# Patient Record
Sex: MALE | Race: OTHER | NOT HISPANIC OR LATINO | URBAN - METROPOLITAN AREA
[De-identification: names, ages, dates, MRNs, and addresses within clinical notes are randomized per-mention and may not be internally consistent; named-entity substitution may affect disease eponyms.]

---

## 2018-02-22 ENCOUNTER — IMPORTED ENCOUNTER (OUTPATIENT)
Dept: URBAN - METROPOLITAN AREA CLINIC 50 | Facility: CLINIC | Age: 68
End: 2018-02-22

## 2018-03-09 ENCOUNTER — IMPORTED ENCOUNTER (OUTPATIENT)
Dept: URBAN - METROPOLITAN AREA CLINIC 50 | Facility: CLINIC | Age: 68
End: 2018-03-09

## 2018-04-05 ENCOUNTER — IMPORTED ENCOUNTER (OUTPATIENT)
Dept: URBAN - METROPOLITAN AREA CLINIC 50 | Facility: CLINIC | Age: 68
End: 2018-04-05

## 2019-01-24 ENCOUNTER — IMAGING SERVICES (OUTPATIENT)
Dept: OTHER | Age: 69
End: 2019-01-24

## 2019-03-08 ENCOUNTER — IMPORTED ENCOUNTER (OUTPATIENT)
Dept: URBAN - METROPOLITAN AREA CLINIC 50 | Facility: CLINIC | Age: 69
End: 2019-03-08

## 2020-03-13 ENCOUNTER — IMPORTED ENCOUNTER (OUTPATIENT)
Dept: URBAN - METROPOLITAN AREA CLINIC 50 | Facility: CLINIC | Age: 70
End: 2020-03-13

## 2020-03-16 ENCOUNTER — IMPORTED ENCOUNTER (OUTPATIENT)
Dept: URBAN - METROPOLITAN AREA CLINIC 50 | Facility: CLINIC | Age: 70
End: 2020-03-16

## 2020-06-04 ENCOUNTER — IMPORTED ENCOUNTER (OUTPATIENT)
Dept: URBAN - METROPOLITAN AREA CLINIC 50 | Facility: CLINIC | Age: 70
End: 2020-06-04

## 2020-06-10 ENCOUNTER — IMPORTED ENCOUNTER (OUTPATIENT)
Dept: URBAN - METROPOLITAN AREA CLINIC 50 | Facility: CLINIC | Age: 70
End: 2020-06-10

## 2020-06-10 NOTE — PATIENT DISCUSSION
"""S/P IOL OD: Tecnis ZCB00 21.5 +ORA/Femto/Arcs +Omidria. Continue post operative instructions and drops per schedule.  """

## 2020-06-17 ENCOUNTER — IMPORTED ENCOUNTER (OUTPATIENT)
Dept: URBAN - METROPOLITAN AREA CLINIC 50 | Facility: CLINIC | Age: 70
End: 2020-06-17

## 2020-06-24 ENCOUNTER — IMPORTED ENCOUNTER (OUTPATIENT)
Dept: URBAN - METROPOLITAN AREA CLINIC 50 | Facility: CLINIC | Age: 70
End: 2020-06-24

## 2020-06-24 NOTE — PATIENT DISCUSSION
"""S/P IOL OS: Tecnis ZCB00 21.5 +Femto/Arcs +Omidria. Continue post operative instructions and drops per schedule.  """

## 2020-07-02 ENCOUNTER — IMPORTED ENCOUNTER (OUTPATIENT)
Dept: URBAN - METROPOLITAN AREA CLINIC 50 | Facility: CLINIC | Age: 70
End: 2020-07-02

## 2020-07-22 ENCOUNTER — IMPORTED ENCOUNTER (OUTPATIENT)
Dept: URBAN - METROPOLITAN AREA CLINIC 50 | Facility: CLINIC | Age: 70
End: 2020-07-22

## 2020-07-22 NOTE — PATIENT DISCUSSION
"""Informed patient that their capsular opacification is not visually significant or does not meet the minimum criteria for capsulotomy.  Recommended attention to PCO symptoms Patient notified of pathology results and follow-up plan.    Copy of pathology results and plan sent to PCP.  Will follow up as planned.  Sally Rich RN 12/17/19 2:41 PM

## 2021-01-13 ENCOUNTER — IMPORTED ENCOUNTER (OUTPATIENT)
Dept: URBAN - METROPOLITAN AREA CLINIC 50 | Facility: CLINIC | Age: 71
End: 2021-01-13

## 2021-01-21 ENCOUNTER — IMPORTED ENCOUNTER (OUTPATIENT)
Dept: URBAN - METROPOLITAN AREA CLINIC 50 | Facility: CLINIC | Age: 71
End: 2021-01-21

## 2021-04-18 ASSESSMENT — VISUAL ACUITY
OD_CC: 20/25-
OS_CC: 20/30
OS_OTHER: 20/30. 20/400.
OD_BAT: 20/30
OS_CC: 20/25-
OD_BAT: 20/60
OD_OTHER: 20/20. 20/20.
OS_OTHER: 20/40. 20/60.
OS_CC: 20/200
OD_CC: J1
OS_OTHER: 20/60. 20/200.
OD_SC: 20/25
OD_CC: J1+@ 16 IN
OS_BAT: 20/60
OS_SC: 20/20
OD_PH: 20/25
OS_SC: 20/20 BLURRY
OS_BAT: 20/60
OS_CC: J1+
OD_CC: 20/20
OS_BAT: 20/20
OS_OTHER: 20/20. 20/20.
OD_OTHER: 20/40-2. 20/70.
OS_CC: J1
OS_OTHER: 20/70. 20/400.
OD_OTHER: 20/25. 20/400.
OS_OTHER: 20/60. 20/60.
OD_OTHER: 20/40-2. 20/70.
OS_CC: 20/25
OS_CC: J1+@ 16 IN
OD_CC: 20/20
OS_CC: 20/200
OD_CC: J1+
OS_PH: 20/30-2
OS_PH: 20/25
OD_OTHER: 20/40-2. 20/70.
OD_OTHER: 20/30. 20/40.
OS_OTHER: 20/60. 20/60.
OD_BAT: 20/20
OD_OTHER: 20/60. 20/200.
OD_CC: 20/30
OD_BAT: 20/40-2
OS_PH: @ 16 IN
OS_CC: J1+
OD_SC: 20/20
OD_SC: 20/20-1
OD_CC: J1+
OS_SC: 20/30
OD_BAT: 20/40-2
OS_CC: 20/40
OD_BAT: 20/40-2
OS_BAT: 20/60
OD_CC: 20/25
OS_BAT: 20/30
OS_BAT: 20/70
OS_CC: 20/20
OD_SC: 20/40
OD_CC: J1@ 14 IN
OD_CC: 20/30-1
OD_BAT: 20/25
OS_BAT: 20/40
OS_CC: J1@ 14 IN
OS_CC: 20/70

## 2021-04-18 ASSESSMENT — TONOMETRY
OS_IOP_MMHG: 10
OS_IOP_MMHG: 14
OD_IOP_MMHG: 14
OD_IOP_MMHG: 14
OD_IOP_MMHG: 11
OS_IOP_MMHG: 11
OS_IOP_MMHG: 15
OD_IOP_MMHG: 16
OD_IOP_MMHG: 11
OS_IOP_MMHG: 17
OS_IOP_MMHG: 11
OD_IOP_MMHG: 14
OD_IOP_MMHG: 14
OD_IOP_MMHG: 11
OD_IOP_MMHG: 31
OS_IOP_MMHG: 12
OS_IOP_MMHG: 14
OS_IOP_MMHG: 16

## 2022-01-05 ENCOUNTER — PREPPED CHART (OUTPATIENT)
Dept: URBAN - METROPOLITAN AREA CLINIC 53 | Facility: CLINIC | Age: 72
End: 2022-01-05

## 2022-02-03 ENCOUNTER — COMPREHENSIVE EXAM (OUTPATIENT)
Dept: URBAN - METROPOLITAN AREA CLINIC 53 | Facility: CLINIC | Age: 72
End: 2022-02-03

## 2022-02-03 DIAGNOSIS — H43.813: ICD-10-CM

## 2022-02-03 DIAGNOSIS — H26.493: ICD-10-CM

## 2022-02-03 PROCEDURE — 92014 COMPRE OPH EXAM EST PT 1/>: CPT

## 2022-02-03 ASSESSMENT — KERATOMETRY
OS_K2POWER_DIOPTERS: 44.25
OD_K1POWER_DIOPTERS: 44.00
OS_AXISANGLE2_DEGREES: 3
OS_AXISANGLE_DEGREES: 93
OD_AXISANGLE_DEGREES: 69
OD_K2POWER_DIOPTERS: 44.25
OS_K1POWER_DIOPTERS: 44.00
OD_AXISANGLE2_DEGREES: 159

## 2022-02-03 ASSESSMENT — VISUAL ACUITY
OD_CC: 20/20-1
OS_GLARE: 20/20
OU_CC: J1+
OD_GLARE: 20/25
OD_GLARE: 20/20
OS_CC: 20/20-1
OS_GLARE: 20/25-1

## 2022-02-03 ASSESSMENT — TONOMETRY
OS_IOP_MMHG: 11
OD_IOP_MMHG: 11

## 2022-12-06 ENCOUNTER — CONTACT LENSES/GLASSES VISIT (OUTPATIENT)
Dept: URBAN - METROPOLITAN AREA CLINIC 53 | Facility: CLINIC | Age: 72
End: 2022-12-06

## 2022-12-06 PROCEDURE — 92015GRNC REFRACTION GLASSES RECHECK NO CHARGE

## 2022-12-06 ASSESSMENT — KERATOMETRY
OS_K1POWER_DIOPTERS: 44.00
OD_K1POWER_DIOPTERS: 44.00
OS_K2POWER_DIOPTERS: 44.25
OD_AXISANGLE_DEGREES: 69
OD_K2POWER_DIOPTERS: 44.25
OS_AXISANGLE2_DEGREES: 3
OS_AXISANGLE_DEGREES: 93
OD_AXISANGLE2_DEGREES: 159

## 2022-12-06 ASSESSMENT — VISUAL ACUITY
OU_CC: 20/20
OS_CC: 20/20
OD_CC: 20/20

## 2022-12-06 NOTE — PATIENT DISCUSSION
Increasing prism from 4BO to [address]. 8BO was found today, however cutting back prism in distance specs to help patient adapt to the change. Having patient trial Fresnel prisms over fashion lenses first before finalizing prescription. Explained to patient the undercorrection. He expressed understanding.  Patient to return in 2 weeks for prism re-eval.

## 2022-12-28 ENCOUNTER — CONTACT LENSES/GLASSES VISIT (OUTPATIENT)
Dept: URBAN - METROPOLITAN AREA CLINIC 53 | Facility: CLINIC | Age: 72
End: 2022-12-28

## 2022-12-28 PROCEDURE — 92015GRNC REFRACTION GLASSES RECHECK NO CHARGE

## 2022-12-28 ASSESSMENT — KERATOMETRY
OS_K1POWER_DIOPTERS: 44.00
OS_AXISANGLE2_DEGREES: 3
OS_K2POWER_DIOPTERS: 44.25
OD_K1POWER_DIOPTERS: 44.00
OD_K2POWER_DIOPTERS: 44.25
OD_AXISANGLE2_DEGREES: 159
OD_AXISANGLE_DEGREES: 69
OS_AXISANGLE_DEGREES: 93

## 2022-12-28 NOTE — PATIENT DISCUSSION
Per pt record Dr. Tristen Rachel believes that this was a traumatic 4th from [address] in 1988 that is partially symptomatic.

## 2024-03-06 ENCOUNTER — ESTABLISHED PATIENT (OUTPATIENT)
Dept: URBAN - METROPOLITAN AREA CLINIC 52 | Facility: CLINIC | Age: 74
End: 2024-03-06

## 2024-03-06 DIAGNOSIS — H53.2: ICD-10-CM

## 2024-03-06 DIAGNOSIS — H26.493: ICD-10-CM

## 2024-03-06 PROCEDURE — 92015 DETERMINE REFRACTIVE STATE: CPT

## 2024-03-06 PROCEDURE — 99214 OFFICE O/P EST MOD 30 MIN: CPT

## 2024-03-06 ASSESSMENT — TONOMETRY
OS_IOP_MMHG: 12
OD_IOP_MMHG: 11

## 2024-03-06 ASSESSMENT — KERATOMETRY
OS_K1POWER_DIOPTERS: 43.75
OS_AXISANGLE_DEGREES: 101
OD_AXISANGLE2_DEGREES: 159
OD_AXISANGLE_DEGREES: 69
OD_K1POWER_DIOPTERS: 44.00
OS_AXISANGLE2_DEGREES: 11
OD_K2POWER_DIOPTERS: 44.00
OD_AXISANGLE2_DEGREES: 90
OD_AXISANGLE_DEGREES: 180
OS_AXISANGLE2_DEGREES: 3
OS_K1POWER_DIOPTERS: 44.00
OD_K2POWER_DIOPTERS: 44.25
OS_AXISANGLE_DEGREES: 93
OS_K2POWER_DIOPTERS: 44.25

## 2024-03-06 ASSESSMENT — VISUAL ACUITY
OS_CC: 20/20
OU_CC: 20/25-2
OD_CC: 20/25

## 2025-04-29 ENCOUNTER — COMPREHENSIVE EXAM (OUTPATIENT)
Age: 75
End: 2025-04-29

## 2025-04-29 DIAGNOSIS — H43.813: ICD-10-CM

## 2025-04-29 DIAGNOSIS — H18.591: ICD-10-CM

## 2025-04-29 DIAGNOSIS — H04.123: ICD-10-CM

## 2025-04-29 PROCEDURE — 99214 OFFICE O/P EST MOD 30 MIN: CPT

## 2025-04-29 PROCEDURE — 92134 CPTRZ OPH DX IMG PST SGM RTA: CPT | Mod: NC

## 2025-05-14 ENCOUNTER — CONSULTATION/EVALUATION (OUTPATIENT)
Age: 75
End: 2025-05-14

## 2025-05-14 DIAGNOSIS — H52.4: ICD-10-CM

## 2025-05-14 DIAGNOSIS — H43.813: ICD-10-CM

## 2025-05-14 DIAGNOSIS — H04.123: ICD-10-CM

## 2025-05-14 DIAGNOSIS — H26.493: ICD-10-CM

## 2025-05-14 DIAGNOSIS — H18.591: ICD-10-CM

## 2025-05-14 PROCEDURE — 66821 AFTER CATARACT LASER SURGERY: CPT

## 2025-05-14 PROCEDURE — 99214 OFFICE O/P EST MOD 30 MIN: CPT | Mod: 57

## 2025-06-18 ENCOUNTER — APPOINTMENT (OUTPATIENT)
Dept: GENERAL RADIOLOGY | Age: 75
End: 2025-06-18
Attending: PHYSICIAN ASSISTANT
Payer: MEDICARE

## 2025-06-18 ENCOUNTER — APPOINTMENT (OUTPATIENT)
Dept: GENERAL RADIOLOGY | Age: 75
End: 2025-06-18
Attending: EMERGENCY MEDICINE
Payer: MEDICARE

## 2025-06-18 ENCOUNTER — OFFICE VISIT (OUTPATIENT)
Dept: FAMILY MEDICINE CLINIC | Facility: CLINIC | Age: 75
End: 2025-06-18
Payer: MEDICARE

## 2025-06-18 ENCOUNTER — HOSPITAL ENCOUNTER (INPATIENT)
Facility: HOSPITAL | Age: 75
LOS: 2 days | Discharge: HOME | End: 2025-06-20
Attending: EMERGENCY MEDICINE | Admitting: INTERNAL MEDICINE
Payer: MEDICARE

## 2025-06-18 ENCOUNTER — APPOINTMENT (OUTPATIENT)
Dept: CT IMAGING | Age: 75
End: 2025-06-18
Attending: PHYSICIAN ASSISTANT
Payer: MEDICARE

## 2025-06-18 VITALS
OXYGEN SATURATION: 98 % | HEART RATE: 124 BPM | WEIGHT: 225.19 LBS | RESPIRATION RATE: 20 BRPM | SYSTOLIC BLOOD PRESSURE: 128 MMHG | TEMPERATURE: 99 F | DIASTOLIC BLOOD PRESSURE: 80 MMHG

## 2025-06-18 DIAGNOSIS — R06.02 SHORTNESS OF BREATH: Primary | ICD-10-CM

## 2025-06-18 DIAGNOSIS — R10.10 UPPER ABDOMINAL PAIN: ICD-10-CM

## 2025-06-18 DIAGNOSIS — R59.0 INTRA-ABDOMINAL LYMPHADENOPATHY: ICD-10-CM

## 2025-06-18 DIAGNOSIS — R11.2 NAUSEA AND VOMITING, UNSPECIFIED VOMITING TYPE: ICD-10-CM

## 2025-06-18 DIAGNOSIS — K56.609 SMALL BOWEL OBSTRUCTION (HCC): Primary | ICD-10-CM

## 2025-06-18 DIAGNOSIS — R00.0 TACHYCARDIA: ICD-10-CM

## 2025-06-18 PROBLEM — K22.70 BARRETT'S ESOPHAGUS: Status: ACTIVE | Noted: 2025-06-18

## 2025-06-18 PROBLEM — K21.9 GASTROESOPHAGEAL REFLUX DISEASE: Status: ACTIVE | Noted: 2025-06-18

## 2025-06-18 PROBLEM — J30.9 ALLERGIC RHINITIS: Status: ACTIVE | Noted: 2023-10-26

## 2025-06-18 LAB
ALBUMIN SERPL-MCNC: 4.5 G/DL (ref 3.2–4.8)
ALBUMIN/GLOB SERPL: 1.3 {RATIO} (ref 1–2)
ALP LIVER SERPL-CCNC: 105 U/L (ref 45–117)
ALT SERPL-CCNC: 23 U/L (ref 10–49)
ANION GAP SERPL CALC-SCNC: 9 MMOL/L (ref 0–18)
AST SERPL-CCNC: 34 U/L (ref ?–34)
BASOPHILS # BLD AUTO: 0.03 X10(3) UL (ref 0–0.2)
BASOPHILS NFR BLD AUTO: 0.3 %
BILIRUB SERPL-MCNC: 0.7 MG/DL (ref 0.2–1.1)
BUN BLD-MCNC: 21 MG/DL (ref 9–23)
CALCIUM BLD-MCNC: 9.2 MG/DL (ref 8.7–10.6)
CHLORIDE SERPL-SCNC: 104 MMOL/L (ref 98–112)
CO2 SERPL-SCNC: 26 MMOL/L (ref 21–32)
CREAT BLD-MCNC: 1.3 MG/DL (ref 0.7–1.3)
EGFRCR SERPLBLD CKD-EPI 2021: 57 ML/MIN/1.73M2 (ref 60–?)
EOSINOPHIL # BLD AUTO: 0.01 X10(3) UL (ref 0–0.7)
EOSINOPHIL NFR BLD AUTO: 0.1 %
ERYTHROCYTE [DISTWIDTH] IN BLOOD BY AUTOMATED COUNT: 14.6 %
GLOBULIN PLAS-MCNC: 3.6 G/DL (ref 2–3.5)
GLUCOSE BLD-MCNC: 125 MG/DL (ref 70–99)
HCT VFR BLD AUTO: 44.4 % (ref 39–53)
HGB BLD-MCNC: 15.2 G/DL (ref 13–17.5)
IMM GRANULOCYTES # BLD AUTO: 0.04 X10(3) UL (ref 0–1)
IMM GRANULOCYTES NFR BLD: 0.4 %
INR BLD: 1.09 (ref 0.8–1.2)
LACTATE SERPL-SCNC: 1.6 MMOL/L (ref 0.5–2)
LDH SERPL L TO P-CCNC: 378 U/L (ref 120–246)
LIPASE SERPL-CCNC: 20 U/L (ref 12–53)
LYMPHOCYTES # BLD AUTO: 0.68 X10(3) UL (ref 1–4)
LYMPHOCYTES NFR BLD AUTO: 7.2 %
MCH RBC QN AUTO: 30.1 PG (ref 26–34)
MCHC RBC AUTO-ENTMCNC: 34.2 G/DL (ref 31–37)
MCV RBC AUTO: 87.9 FL (ref 80–100)
MONOCYTES # BLD AUTO: 0.89 X10(3) UL (ref 0.1–1)
MONOCYTES NFR BLD AUTO: 9.4 %
NEUTROPHILS # BLD AUTO: 7.84 X10 (3) UL (ref 1.5–7.7)
NEUTROPHILS # BLD AUTO: 7.84 X10(3) UL (ref 1.5–7.7)
NEUTROPHILS NFR BLD AUTO: 82.6 %
OSMOLALITY SERPL CALC.SUM OF ELEC: 292 MOSM/KG (ref 275–295)
PLATELET # BLD AUTO: 326 10(3)UL (ref 150–450)
POTASSIUM SERPL-SCNC: 3.5 MMOL/L (ref 3.5–5.1)
PROT SERPL-MCNC: 8.1 G/DL (ref 5.7–8.2)
PROTHROMBIN TIME: 13.9 SECONDS (ref 11.6–14.8)
RBC # BLD AUTO: 5.05 X10(6)UL (ref 3.8–5.8)
SODIUM SERPL-SCNC: 139 MMOL/L (ref 136–145)
TROPONIN I SERPL HS-MCNC: 4 NG/L (ref ?–53)
TROPONIN I SERPL HS-MCNC: 4 NG/L (ref ?–53)
WBC # BLD AUTO: 9.5 X10(3) UL (ref 4–11)

## 2025-06-18 PROCEDURE — 71045 X-RAY EXAM CHEST 1 VIEW: CPT | Performed by: EMERGENCY MEDICINE

## 2025-06-18 PROCEDURE — 99223 1ST HOSP IP/OBS HIGH 75: CPT | Performed by: INTERNAL MEDICINE

## 2025-06-18 PROCEDURE — 99215 OFFICE O/P EST HI 40 MIN: CPT | Performed by: FAMILY MEDICINE

## 2025-06-18 PROCEDURE — 74177 CT ABD & PELVIS W/CONTRAST: CPT | Performed by: PHYSICIAN ASSISTANT

## 2025-06-18 PROCEDURE — 71045 X-RAY EXAM CHEST 1 VIEW: CPT | Performed by: PHYSICIAN ASSISTANT

## 2025-06-18 RX ORDER — ONDANSETRON 2 MG/ML
4 INJECTION INTRAMUSCULAR; INTRAVENOUS EVERY 6 HOURS PRN
Status: DISCONTINUED | OUTPATIENT
Start: 2025-06-18 | End: 2025-06-18

## 2025-06-18 RX ORDER — ONDANSETRON 2 MG/ML
4 INJECTION INTRAMUSCULAR; INTRAVENOUS ONCE
Status: COMPLETED | OUTPATIENT
Start: 2025-06-18 | End: 2025-06-18

## 2025-06-18 RX ORDER — ACETAMINOPHEN 500 MG
500 TABLET ORAL EVERY 4 HOURS PRN
Status: DISCONTINUED | OUTPATIENT
Start: 2025-06-18 | End: 2025-06-20

## 2025-06-18 RX ORDER — METOCLOPRAMIDE HYDROCHLORIDE 5 MG/ML
5 INJECTION INTRAMUSCULAR; INTRAVENOUS EVERY 8 HOURS PRN
Status: DISCONTINUED | OUTPATIENT
Start: 2025-06-18 | End: 2025-06-20

## 2025-06-18 RX ORDER — METOCLOPRAMIDE HYDROCHLORIDE 5 MG/ML
5 INJECTION INTRAMUSCULAR; INTRAVENOUS 3 TIMES DAILY
Status: DISCONTINUED | OUTPATIENT
Start: 2025-06-18 | End: 2025-06-18

## 2025-06-18 RX ORDER — SODIUM CHLORIDE 9 MG/ML
INJECTION, SOLUTION INTRAVENOUS CONTINUOUS
Status: DISCONTINUED | OUTPATIENT
Start: 2025-06-18 | End: 2025-06-20

## 2025-06-18 RX ORDER — ENOXAPARIN SODIUM 100 MG/ML
40 INJECTION SUBCUTANEOUS DAILY
Status: DISCONTINUED | OUTPATIENT
Start: 2025-06-18 | End: 2025-06-20

## 2025-06-18 RX ORDER — MORPHINE SULFATE 2 MG/ML
1 INJECTION, SOLUTION INTRAMUSCULAR; INTRAVENOUS EVERY 2 HOUR PRN
Status: DISCONTINUED | OUTPATIENT
Start: 2025-06-18 | End: 2025-06-20

## 2025-06-18 RX ORDER — MORPHINE SULFATE 2 MG/ML
2 INJECTION, SOLUTION INTRAMUSCULAR; INTRAVENOUS EVERY 2 HOUR PRN
Status: DISCONTINUED | OUTPATIENT
Start: 2025-06-18 | End: 2025-06-20

## 2025-06-18 RX ORDER — MORPHINE SULFATE 4 MG/ML
4 INJECTION, SOLUTION INTRAMUSCULAR; INTRAVENOUS EVERY 2 HOUR PRN
Status: DISCONTINUED | OUTPATIENT
Start: 2025-06-18 | End: 2025-06-20

## 2025-06-18 RX ORDER — PANTOPRAZOLE SODIUM 40 MG/1
40 TABLET, DELAYED RELEASE ORAL DAILY
COMMUNITY

## 2025-06-18 NOTE — ED INITIAL ASSESSMENT (HPI)
Pt to ed with c/o n/v and one episode of diarrhea, SOB on exertion. Since this AM, denies CP    Statement Selected

## 2025-06-18 NOTE — ED QUICK NOTES
Patient's NG tube disconnected from LIS and clamped for ambulance transport to [facility].

## 2025-06-18 NOTE — ED PROVIDER NOTES
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  States he has had approximately 8 episodes of nonbloody emesis and 1 episode of nonbloody diarrhea.  States abdomen feels \"sore\".  He denies any chest pain but feels like his breathing is \"labored\".  No pain with deep breaths.  No fevers or URI symptoms.  States he went to his PCP and was sent to ED due to tachycardia.  Surgical history significant for cholecystectomy.      Objective:     Past Medical History:    Hickey's syndrome              Past Surgical History:   Procedure Laterality Date    Excis lumbar disk,one level      Removal gallbladder                  Social History     Socioeconomic History    Marital status:    Tobacco Use    Smoking status: Never    Smokeless tobacco: Never   Vaping Use    Vaping status: Never Used   Substance and Sexual Activity    Alcohol use: Never    Drug use: Never     Social Drivers of Health     Transportation Needs: No Transportation Needs (4/12/2023)    [facility]     Lack of Transportation (Medical): No     Lack of Transportation (Non-Medical): No                Physical Exam    ED Triage Vitals [06/18/25 1120]   BP (!) 141/93   Pulse 109   Resp 18   Temp 98.7 °F (37.1 °C)   Temp src Oral   SpO2 98 %   O2 Device None (Room air)       Current Vitals:   Vital Signs  BP: 140/79  Pulse: 80  Resp: 18  Temp: 98.7 °F (37.1 °C)  Temp src: Oral    Oxygen Therapy  SpO2: 99 %  O2 Device: None (Room air)            Physical Exam  Pulse ox reviewed. O2 saturation within  normal limits for this pt    Constitutional: no acute distress, nontoxic appearing,   Head: normocephalic atraumatic,   Eyes: PERRL, EOMI,   ENT: dry oral mucosa  Neck: supple, full ROM  Heart: tachycardic  Lungs: no respiratory distress, speaking in full sentences  GI: abdomen nondistended, soft, + right sided abdominal tenderness, otherwise nontender, no peritoneal signs  MSK: normal movement of all extremities  Skin: pink, warm  Neurology: alert, responding appropriately        ED Course  Labs Reviewed   CBC WITH DIFFERENTIAL WITH PLATELET - Abnormal; Notable for the following components:       Result Value    Neutrophil Absolute Prelim 7.84 (*)     Neutrophil Absolute 7.84 (*)     Lymphocyte Absolute 0.68 (*)     All other components within normal limits   COMP METABOLIC PANEL (14) - Abnormal; Notable for the following components:    Glucose 125 (*)     eGFR-Cr 57 (*)     AST 34 (*)     Globulin  3.6 (*)     All other components within normal limits   LIPASE - Normal   TROPONIN I HIGH SENSITIVITY - Normal   TROPONIN I HIGH SENSITIVITY - Normal   LACTIC ACID, PLASMA - Normal   RAINBOW DRAW LAVENDER   RAINBOW DRAW LIGHT GREEN   RAINBOW DRAW BLUE     EKG    Rate, intervals and axes as noted on EKG Report.  Rate: 100  Rhythm: Sinus Rhythm  Reading: RBBB with LAFB           ED Course as of 06/18/25 1433  ------------------------------------------------------------  Time: 06/18 1320  Value: WBC: 9.5  Comment: (Reviewed)  ------------------------------------------------------------  Time: 06/18 1320  Value: Glucose(!): 125  Comment: (Reviewed)  ------------------------------------------------------------  Time: 06/18 1320  Value: Sodium: 139  Comment: (Reviewed)  ------------------------------------------------------------  Time: 06/18 1320  Value: Potassium: 3.5  Comment: (Reviewed)  ------------------------------------------------------------  Time: 06/18 1321  Value: CREATININE: 1.30  Comment:  (Reviewed)  ------------------------------------------------------------  Time: 06/18 1321  Value: AST (SGOT)(!): 34  Comment: (Reviewed)  ------------------------------------------------------------  Time: 06/18 1321  Value: ALT (SGPT): 23  Comment: (Reviewed)  ------------------------------------------------------------  Time: 06/18 1321  Value: Total Bilirubin: 0.7  Comment: (Reviewed)  ------------------------------------------------------------  Time: 06/18 1321  Value: Lipase: 20  Comment: (Reviewed)  ------------------------------------------------------------  Time: 06/18 1321  Value: LACTIC ACID: 1.6  Comment: (Reviewed)  ------------------------------------------------------------  Time: 06/18 1321  Value: Troponin I (High Sensitivity): 4  Comment: (Reviewed)     CT ABDOMEN+PELVIS(CONTRAST ONLY)(CPT=74177)  Result Date: 6/18/2025  PROCEDURE:  CT ABDOMEN+PELVIS (CONTRAST ONLY) (CPT=74177)  COMPARISON:  None.  INDICATIONS:  RLQ abdominal pain, vomiting since yesterday  TECHNIQUE:  CT scanning was performed from the dome of the diaphragm to the pubic symphysis with non-ionic intravenous contrast material. Post contrast coronal MPR imaging was performed.  Dose reduction techniques were used. Dose information is transmitted to the ACR (American College of Radiology) NRDR (National Radiology Data Registry) which includes the Dose Index Registry.  PATIENT STATED HISTORY:(As transcribed by Technologist)  Patient has right lower quadrant pain and vomiting since yesterday.   CONTRAST USED:  100cc of Isovue 370  FINDINGS:  LIVER:  Indeterminate 1.6 cm lesion within the right hepatic dome.  Additional subcentimeter lesions are present, which are too small to characterize.  No steatosis. BILIARY:  Status post cholecystectomy.  No significant biliary ductal dilation. PANCREAS:  No lesion, fluid collection, ductal dilatation, or atrophy.  SPLEEN:  No enlargement or focal lesion.  KIDNEYS:  No mass, obstruction, or  calcification.  Bilateral parapelvic cysts. ADRENALS:  No mass or enlargement.  AORTA/VASCULAR:  Atherosclerotic disease without aneurysm RETROPERITONEUM:  There is conglomerate soft tissue within the retroperitoneum encasing the aorta and bilateral renal vessels, which measures up to approximately 5.4 x 3.9 x 5.4 cm. BOWEL/MESENTERY:  There is dilated proximal small bowel measuring up to approximately 3.5 cm in diameter.  This gradually tapers to normal caliber, however there is a focal luminal narrowing within the mid abdomen (series 2 image 70), which could reflect  a transition zone.  The small bowel distal to this suspected transition zone is near collapsed.  No colonic dilation.  Colonic diverticulosis, without diverticulitis.  Appendix is not visualized.  There is an approximately 3.0 x 1.8 x 2.5 cm soft tissue  mass within the central mesentery with surrounding mesenteric stranding.  Additional subcentimeter prominent lymph nodes are present. ABDOMINAL WALL:  No mass or hernia.  URINARY BLADDER:  No visible focal wall thickening, lesion, or calculus.  PELVIC NODES:  No adenopathy.  PELVIC ORGANS:  No visible mass.  Pelvic organs appropriate for patient age.  BONES:  Degenerative changes of the spine.  Postsurgical changes of L4-S1 pedicle screw and marisol fixation with interbody fusion. LUNG BASES:  No visible pulmonary or pleural disease.  OTHER:  Negative.             CONCLUSION:  1. Findings concerning for small-bowel obstruction with transition point in the right anterior abdomen.  Upstream small bowel dilation measures up to 3.5 cm in diameter with collapsed small bowel distally to the transition site. 2. Haziness of the central mesentery with several subcentimeter lymph nodes are present, which can be seen in the setting of sclerosing mesenteritis, however there is an enlarged lymph node/soft tissue mass measuring up to 3.0 cm, which could reflect underlying lymphoma versus other malignancy, correlate  clinically.  Consider PET-CT for further evaluation. 3. Bulky conglomerate soft tissue within the retroperitoneum, which could reflect conglomerate lymphadenopathy.  Findings can also be seen in the setting of retroperitoneal fibrosis, however given mesenteric findings lymphoma/malignancy should be excluded.  Consider PET-CT for further evaluation. 4. Please see above for further findings.   LOCATION:  [facility]   Dictated by (CST): Nathen Meek MD on 6/18/2025 at 2:08 PM     Finalized by (CST): Nathen Meek MD on 6/18/2025 at 2:22 PM       XR CHEST AP PORTABLE  (CPT=71045)  Result Date: 6/18/2025  PROCEDURE:  XR CHEST AP PORTABLE  (CPT=71045)  TECHNIQUE:  AP chest radiograph was obtained.  COMPARISON:  None.  INDICATIONS:  vomiting and abd pain and reji onset yesterday  PATIENT STATED HISTORY: (As transcribed by Technologist)  Pt c/o vomiting that started yesterday, umbilical pain and shortness of breath today.    FINDINGS:  Low lung volumes.  Normal heart size and pulmonary vascularity.  Mildly tortuous aorta.  No focal pulmonary opacity.            CONCLUSION:  No evidence of active cardiopulmonary disease.   LOCATION:  [facility]      Dictated by (CST): Jah Lu MD on 6/18/2025 at 12:58 PM     Finalized by (CST): Jah Lu MD on 6/18/2025 at 12:59 PM                      MDM     Ddx: appendicitis, colitis, enteritis, acs,    MDM: Patient with 1 day history of vomiting, abdominal pain and single episode of diarrhea.  Abdomen is soft but he does have tenderness to the right side of the abdomen.  Vitals are stable.  He has no leukocytosis.  Lactic is normal at 1.6.  EKG and troponin without evidence of ACS.  LFTs and lipase are unremarkable and patient has a known history of cholecystectomy.  CT abdomen pelvis concerning for small bowel obstruction with transition point in the right lower abdomen.  Also scattered lymphadenopathy which is concerning for malignancy.  He will require admission for surgical  consults as well as further imaging to evaluate lymphadenopathy.        Medical Decision Making  Amount and/or Complexity of Data Reviewed  Labs: ordered. Decision-making details documented in ED Course.  Radiology: ordered. Decision-making details documented in ED Course.  ECG/medicine tests: ordered. Decision-making details documented in ED Course.        Disposition and Plan     Clinical Impression:  1. Small bowel obstruction (HCC)    2. Intra-abdominal lymphadenopathy         Disposition:  There is no disposition on file for this visit.  There is no disposition time on file for this visit.    Follow-up:  No follow-up provider specified.        Medications Prescribed:  Current Discharge Medication List                Supplementary Documentation:

## 2025-06-18 NOTE — PROGRESS NOTES
Moises Grace is a 75 year old male.    S:  Patient presents today with the following concerns:  Chief Complaint   Patient presents with    Vomiting     Vomited 8 times per pt every 2 hours, no OTC meds taken. With mid upper abd pain   Lives in [place] now.  Visiting family in the area.  Drove back from [place] yesterday.  Started with vomiting at 11 AM yesterday morning.  Upper abdominal pain.  No appetite.  No vomiting for 8 hours.  1 episode of diarrhea.  No bloody or black stools.  Hx of cholecystectomy.  No other abdominal surgeries.  He denies chest pain.  No back pain.    He feels like he is having difficulty breathing especially when he lies down.  This started yesterday as well.  Wife has not had any vomiting or diarrhea.    Current Medications[1]  Problem List[2]  Family History[3]    REVIEW OF SYSTEMS:  GENERAL: feels unwell  SKIN: denies any unusual skin lesions  EYES:denies vision change  LUNGS: see above  CARDIOVASCULAR: denies chest pain on exertion  GI: see above  : denies dysuria  MUSCULOSKELETAL: denies back pain  NEURO: denies headaches    EXAM:  /80   Pulse (!) 124   Temp 98.5 °F (36.9 °C) (Oral)   Resp 20   Wt 225 lb 3.2 oz (102.2 kg)   SpO2 98%   Physical Exam  Constitutional:       General: He is not in acute distress.     Appearance: Normal appearance. He is not ill-appearing, toxic-appearing or diaphoretic.   HENT:      Head: Normocephalic and atraumatic.   Eyes:      Extraocular Movements: Extraocular movements intact.      Conjunctiva/sclera: Conjunctivae normal.      Pupils: Pupils are equal, round, and reactive to light.   Cardiovascular:      Rate and Rhythm: Regular rhythm. Tachycardia present.   Abdominal:      Palpations: Abdomen is soft. There is no mass.      Tenderness: There is abdominal tenderness in the epigastric area. There is no guarding or rebound.   Musculoskeletal:      Cervical back: Neck supple. No rigidity or tenderness.   Lymphadenopathy:      Cervical: No  cervical adenopathy.   Skin:     General: Skin is warm and dry.   Neurological:      General: No focal deficit present.      Mental Status: He is alert and oriented to person, place, and time.   Psychiatric:         Mood and Affect: Mood normal.         Behavior: Behavior normal.        ASSESSMENT AND PLAN:  Moises Grace is a 75 year old male.  Encounter Diagnoses   Name Primary?    Shortness of breath Yes    Tachycardia     Upper abdominal pain     Nausea and vomiting, unspecified vomiting type        No results found.     No orders of the defined types were placed in this encounter.    Meds & Refills for this Visit:  Requested Prescriptions      No prescriptions requested or ordered in this encounter     Imaging & Consults:  None    No follow-ups on file.     Discussed with patient and his wife that higher level of care is needed.  He is having some trouble breathing that is noticeable when he lies down. He is having upper abdominal pain.  Cannot rule out cardiac issue here.   HR is elevated.    Recommend ED evaluation.  Patient and his wife decline EMS transport.  Wife will drive him directly to the [place] ED.      He left the clinic in stable condition.         [1]   Current Outpatient Medications   Medication Sig Dispense Refill    pantoprazole 40 MG Oral Tab EC Take 1 tablet (40 mg total) by mouth daily.     [2] There is no problem list on file for this patient.   [3] No family history on file.

## 2025-06-18 NOTE — H&P
[facility]                                                               [facility]         Moises Grace Patient Status:  Inpatient    6/3/1950 MRN [id number]   Location [facility] 7NE-A Attending Barbra Teague MD   Hosp Day # 0 PCP No primary care provider on file.     Chief Complaint: Intractable nausea vomiting, abdominal pain    History of Present Illness:  Moises Grace is a 75 year old male admitted with intractable nausea vomiting and abdominal pain, started yesterday around 1 PM according to patient.  Patient states he has been vomiting every 2 hours since then according to patient.  Patient states last episode of vomiting was around 3 AM today .  Has history of Hickey's esophagus and acid reflux and takes Protonix at home.  Had 1 episode of diarrhea.  Lower abdomen feels sore according to patient.  Patient states he started feeling short of breath and went to his primary care physician today and was sent to the emergency room.  Denies any chest pain.  Patient states he has had prior EGD and colonoscopy and polypectomy  in 2024 while in [place].  CT abdomen pelvis done in ER showed SBO and NG tube inserted from ER and admitted with surgeon on consult.    History:  Past Medical History:    Hickey's syndrome       Past Surgical History:   Procedure Laterality Date    Colonoscopy      egd and colonoscopy in  per pt in [place] per pt    Excis lumbar disk,one level      Removal gallbladder         Family history:  Family History   Problem Relation Age of Onset    Hypertension Father     Cancer Mother    Mom had colon cancer    Social history:   reports that he has never smoked. He has never used smokeless tobacco. He reports current alcohol use of about 1.0 standard drink of alcohol per week. He reports that he does not use drugs.    Allergies:  Allergies   Allergen Reactions    Prednisone UNKNOWN and OTHER (SEE COMMENTS)     PT STATES SIDE EFFECT TO ORAL ONLY  (tolerates IV)  headaches       Home Medications:  Medications Prior to Admission   Medication Sig Dispense Refill Last Dose/Taking    pantoprazole 40 MG Oral Tab EC Take 1 tablet (40 mg total) by mouth daily.          Review of Systems:  A comprehensive 14 point review of systems was completed.  Pertinent positives and negatives noted in the the HPI.    Physical Exam:     Vital signs: Blood pressure 135/76, pulse 85, temperature 99.1 °F (37.3 °C), temperature source Temporal, resp. rate 16, height 6' 2\" (1.88 m), weight 225 lb (102.1 kg), SpO2 98%.  General: No acute distress.   HEENT: Moist mucous membranes.   Respiratory: Clear to auscultation bilaterally.  No wheezes. No rhonchi.  Cardiovascular: S1, S2.  Regular rate and rhythm.    Abdomen: Soft, nontender, nondistended.  Positive bowel sounds.  NG tube to low intermittent suction  Neurologic: Awake alert, no focal neurological deficits.  Musculoskeletal: Full range of motion of all extremities.  No pedal edema or calf tenderness  Psychiatric: Appropriate mood and affect.      Diagnostic Data:      Laboratory Data:   Lab Results   Component Value Date    WBC 9.5 06/18/2025    HGB 15.2 06/18/2025    HCT 44.4 06/18/2025    .0 06/18/2025    CREATSERUM 1.30 06/18/2025    BUN 21 06/18/2025     06/18/2025    K 3.5 06/18/2025     06/18/2025    CO2 26.0 06/18/2025     06/18/2025    CA 9.2 06/18/2025    ALB 4.5 06/18/2025    ALKPHO 105 06/18/2025    BILT 0.7 06/18/2025    TP 8.1 06/18/2025    AST 34 06/18/2025    ALT 23 06/18/2025    LIP 20 06/18/2025     Recent Labs   Lab 06/18/25  1142 06/18/25  1349   TROPHS 4 4       Imaging:  Imaging data reviewed in Epic.  CT abdomen and pelvis done in ER on 6/18/2025 showed    CONCLUSION:    1. Findings concerning for small-bowel obstruction with transition point in the right anterior abdomen.  Upstream small bowel dilation measures up to 3.5 cm in diameter with collapsed small bowel distally to the  transition site.   2. Haziness of the central mesentery with several subcentimeter lymph nodes are present, which can be seen in the setting of sclerosing mesenteritis, however there is an enlarged lymph node/soft tissue mass measuring up to 3.0 cm, which could reflect   underlying lymphoma versus other malignancy, correlate clinically.  Consider PET-CT for further evaluation.   3. Bulky conglomerate soft tissue within the retroperitoneum, which could reflect conglomerate lymphadenopathy.  Findings can also be seen in the setting of retroperitoneal fibrosis, however given mesenteric findings lymphoma/malignancy should be   excluded.       ASSESSMENT / PLAN:     SBO with intractable nausea vomiting and abdominal pain  N.p.o.  NG tube to low intermittent suction  IV fluids  IV Reglan as needed nausea vomiting-discussed with pharmacy who advised to hold off on Zofran at this time due to elevated QTc interval and EKG  IV pain medications as needed  Surgical oncologist consulted from ER  Enlarged lymph nodes/soft tissue mass and bulky conglomerate soft tissue within the retroperitoneum which could reflect conglomerate lymphadenopathy in abdomen, rule out lymphoma or other malignancy, rule out retroperitoneal fibrosis etc. per radiologist  Will check LDH  Will have hematologist oncologist also on consult  GERD, Hickey's esophagus, intractable nausea vomiting at home with coffee grounds seen in NG tube at this time  IV PPI twice daily  Repeat CBC now and in a.m. ,initial hemoglobin was 15.2  GI consult    Quality:  DVT Prophylaxis: DVT Mechanical Prophylaxis:   SCDs,    DVT Pharmacologic Prophylaxis   Medication    enoxaparin (Lovenox) 40 MG/0.4ML SUBQ injection 40 mg              CODE status:   Code Status: Not on file  Valenzuela: No urinary catheter in place      Plan of care discussed with patient      Discussed with ER Physician.  My colleague will follow from morning tomorrow  **Certification      PHYSICIAN Certification  of Need for Inpatient Hospitalization - Initial Certification    Patient will require inpatient services that will reasonably be expected to span two midnight's based on the clinical documentation in H+P.   Based on patients current state of illness, I anticipate that, after discharge, patient will require TBD.      Barbra Teague MD  6/18/2025  4:18 PM

## 2025-06-18 NOTE — ED QUICK NOTES
Orders for admission, patient is aware of plan and ready to go upstairs. Any questions, please call ED DANA Pham at extension [phone].     Patient Covid vaccination status: Unvaccinated     COVID Test Ordered in ED: None    COVID Suspicion at Admission: N/A    Running Infusions: None    Mental Status/LOC at time of transport: A&Ox4    Other pertinent information: N/A  CIWA score: N/A   NIH score:  N/A

## 2025-06-18 NOTE — PLAN OF CARE
Assumed care @1630  VSS,   A&Ox4, RA  NSR with 1st degree ,   Denies Pain, Up as tolerated.    NPO, NG tube to LIS with dark brown/maroon output.   IVF infusing 0.9@100ml/hr    Updated POC with patient.  Admission navigator complete.        Problem: RESPIRATORY - ADULT  Goal: Achieves optimal ventilation and oxygenation  Description: INTERVENTIONS:  - Assess for changes in respiratory status  - Assess for changes in mentation and behavior  - Position to facilitate oxygenation and minimize respiratory effort  - Oxygen supplementation based on oxygen saturation or ABGs  - Provide Smoking Cessation handout, if applicable  - Encourage broncho-pulmonary hygiene including cough, deep breathe, Incentive Spirometry  - Assess the need for suctioning and perform as needed  - Assess and instruct to report SOB or any respiratory difficulty  - Respiratory Therapy support as indicated  - Manage/alleviate anxiety  - Monitor for signs/symptoms of CO2 retention  Outcome: Progressing     Problem: GASTROINTESTINAL - ADULT  Goal: Minimal or absence of nausea and vomiting  Description: INTERVENTIONS:  - Maintain adequate hydration with IV or PO as ordered and tolerated  - Nasogastric tube to low intermittent suction as ordered  - Evaluate effectiveness of ordered antiemetic medications  - Provide nonpharmacologic comfort measures as appropriate  - Advance diet as tolerated, if ordered  - Obtain nutritional consult as needed  - Evaluate fluid balance  Outcome: Progressing  Goal: Maintains adequate nutritional intake (undernourished)  Description: INTERVENTIONS:  - Monitor percentage of each meal consumed  - Identify factors contributing to decreased intake, treat as appropriate  - Assist with meals as needed  - Monitor I&O, WT and lab values  - Obtain nutritional consult as needed  - Optimize oral hygiene and moisture  - Encourage food from home; allow for food preferences  - Enhance eating environment  Outcome: Progressing

## 2025-06-19 ENCOUNTER — APPOINTMENT (OUTPATIENT)
Dept: CT IMAGING | Facility: HOSPITAL | Age: 75
End: 2025-06-19
Attending: INTERNAL MEDICINE
Payer: MEDICARE

## 2025-06-19 PROBLEM — R59.0 RETROPERITONEAL LYMPHADENOPATHY: Status: ACTIVE | Noted: 2025-06-19

## 2025-06-19 LAB
AFP-TM SERPL-MCNC: <2.2 NG/ML (ref ?–8)
ALBUMIN SERPL-MCNC: 4.3 G/DL (ref 3.2–4.8)
ALBUMIN/GLOB SERPL: 1.4 {RATIO} (ref 1–2)
ALP LIVER SERPL-CCNC: 87 U/L (ref 45–117)
ALT SERPL-CCNC: 27 U/L (ref 10–49)
ANION GAP SERPL CALC-SCNC: 8 MMOL/L (ref 0–18)
AST SERPL-CCNC: 59 U/L (ref ?–34)
ATRIAL RATE: 100 BPM
BASOPHILS # BLD AUTO: 0.04 X10(3) UL (ref 0–0.2)
BASOPHILS NFR BLD AUTO: 0.6 %
BILIRUB SERPL-MCNC: 0.6 MG/DL (ref 0.2–1.1)
BUN BLD-MCNC: 19 MG/DL (ref 9–23)
CALCIUM BLD-MCNC: 9.2 MG/DL (ref 8.7–10.6)
CANCER AG19-9 SERPL-ACNC: 6.9 U/ML (ref ?–35)
CEA SERPL-MCNC: <0.5 NG/ML (ref ?–5)
CHLORIDE SERPL-SCNC: 107 MMOL/L (ref 98–112)
CO2 SERPL-SCNC: 30 MMOL/L (ref 21–32)
CREAT BLD-MCNC: 1.27 MG/DL (ref 0.7–1.3)
EGFRCR SERPLBLD CKD-EPI 2021: 59 ML/MIN/1.73M2 (ref 60–?)
EOSINOPHIL # BLD AUTO: 0.07 X10(3) UL (ref 0–0.7)
EOSINOPHIL NFR BLD AUTO: 1.1 %
ERYTHROCYTE [DISTWIDTH] IN BLOOD BY AUTOMATED COUNT: 14.8 %
GLOBULIN PLAS-MCNC: 3.1 G/DL (ref 2–3.5)
GLUCOSE BLD-MCNC: 98 MG/DL (ref 70–99)
HCT VFR BLD AUTO: 40.9 % (ref 39–53)
HGB BLD-MCNC: 13.5 G/DL (ref 13–17.5)
IMM GRANULOCYTES # BLD AUTO: 0.03 X10(3) UL (ref 0–1)
IMM GRANULOCYTES NFR BLD: 0.5 %
LYMPHOCYTES # BLD AUTO: 1.01 X10(3) UL (ref 1–4)
LYMPHOCYTES NFR BLD AUTO: 15.6 %
MAGNESIUM SERPL-MCNC: 2.1 MG/DL (ref 1.6–2.6)
MCH RBC QN AUTO: 29.6 PG (ref 26–34)
MCHC RBC AUTO-ENTMCNC: 33 G/DL (ref 31–37)
MCV RBC AUTO: 89.7 FL (ref 80–100)
MONOCYTES # BLD AUTO: 0.93 X10(3) UL (ref 0.1–1)
MONOCYTES NFR BLD AUTO: 14.4 %
NEUTROPHILS # BLD AUTO: 4.39 X10 (3) UL (ref 1.5–7.7)
NEUTROPHILS # BLD AUTO: 4.39 X10(3) UL (ref 1.5–7.7)
NEUTROPHILS NFR BLD AUTO: 67.8 %
OSMOLALITY SERPL CALC.SUM OF ELEC: 302 MOSM/KG (ref 275–295)
P AXIS: 3 DEGREES
P-R INTERVAL: 188 MS
PHOSPHATE SERPL-MCNC: 2.9 MG/DL (ref 2.4–5.1)
PLATELET # BLD AUTO: 269 10(3)UL (ref 150–450)
POTASSIUM SERPL-SCNC: 4.4 MMOL/L (ref 3.5–5.1)
PROT SERPL-MCNC: 7.4 G/DL (ref 5.7–8.2)
PSA SERPL-MCNC: 1.08 NG/ML (ref ?–4)
Q-T INTERVAL: 390 MS
QRS DURATION: 142 MS
QTC CALCULATION (BEZET): 503 MS
R AXIS: -66 DEGREES
RBC # BLD AUTO: 4.56 X10(6)UL (ref 3.8–5.8)
SODIUM SERPL-SCNC: 145 MMOL/L (ref 136–145)
T AXIS: -5 DEGREES
VENTRICULAR RATE: 100 BPM
WBC # BLD AUTO: 6.5 X10(3) UL (ref 4–11)

## 2025-06-19 PROCEDURE — 77012 CT SCAN FOR NEEDLE BIOPSY: CPT | Performed by: INTERNAL MEDICINE

## 2025-06-19 PROCEDURE — 49180 BIOPSY ABDOMINAL MASS: CPT | Performed by: INTERNAL MEDICINE

## 2025-06-19 PROCEDURE — 0WBH3ZX EXCISION OF RETROPERITONEUM, PERCUTANEOUS APPROACH, DIAGNOSTIC: ICD-10-PCS | Performed by: RADIOLOGY

## 2025-06-19 PROCEDURE — 99232 SBSQ HOSP IP/OBS MODERATE 35: CPT | Performed by: HOSPITALIST

## 2025-06-19 PROCEDURE — 71260 CT THORAX DX C+: CPT | Performed by: INTERNAL MEDICINE

## 2025-06-19 PROCEDURE — 99223 1ST HOSP IP/OBS HIGH 75: CPT | Performed by: INTERNAL MEDICINE

## 2025-06-19 RX ORDER — NALOXONE HYDROCHLORIDE 0.4 MG/ML
0.08 INJECTION, SOLUTION INTRAMUSCULAR; INTRAVENOUS; SUBCUTANEOUS AS NEEDED
Status: DISCONTINUED | OUTPATIENT
Start: 2025-06-19 | End: 2025-06-20

## 2025-06-19 RX ORDER — FLUMAZENIL 0.1 MG/ML
0.2 INJECTION INTRAVENOUS AS NEEDED
Status: DISCONTINUED | OUTPATIENT
Start: 2025-06-19 | End: 2025-06-20

## 2025-06-19 RX ORDER — SODIUM CHLORIDE 9 MG/ML
INJECTION, SOLUTION INTRAVENOUS CONTINUOUS
Status: DISCONTINUED | OUTPATIENT
Start: 2025-06-19 | End: 2025-06-20

## 2025-06-19 RX ORDER — MIDAZOLAM HYDROCHLORIDE 1 MG/ML
1 INJECTION INTRAMUSCULAR; INTRAVENOUS EVERY 5 MIN PRN
Status: DISCONTINUED | OUTPATIENT
Start: 2025-06-19 | End: 2025-06-20

## 2025-06-19 NOTE — IMAGING NOTE
Riaz is here for CT guided retroperitoneal mass biopsy. IV access established prior to arrival to CT room 1; 0.9 NS IV initiated to maintain patency.    Informed consent obtained by Dr DELPHINE Lu. Patient positioned on scanner. Biopsy obtained. Specimen sent to Pathology.      Presently denies pain. Tolerated procedure well. Please see flowsheets for vital signs and/or additional information.    Transported pt to  [id number] accompanied by Levi RN. Bedside report given to Soila CTU  RN. Biopsy site/ drsg verified C/D/I.

## 2025-06-19 NOTE — CONSULTS
[facility]  Report of Consultation    Moises Grace Patient Status:  Inpatient    6/3/1950 MRN [id number]   [facility] 7NE-A Attending Enrique Kiser MD   Hosp Day # 1 PCP No primary care provider on file.     Reason for Consultation:    SBO, abdominal adenopathy.    History of Present Illness:    Moises Grace is a 75 year old male that was admitted through the ER yesterday with worsening abdominal pain that started suddenly.  He also had vomiting and diarrhea.  CT showed SBO with enlarged LN/soft tissue mass in the mesentery and bulky soft tissue in the retroperitoneum which was concerning for lymphadenopathy.  Consult requested for same.    He lives in [place].  Drove here last week to visit his son.  Denies any symptoms few days back.  Has intentionally lost 10 pounds of weight but denies any fevers, chills or night sweats.  Colonoscopy was a year back in [place].    History:  Past Medical History[1]  Past Surgical History[2]  Family History[3]   reports that he has never smoked. He has never used smokeless tobacco. He reports current alcohol use of about 1.0 standard drink of alcohol per week. He reports that he does not use drugs.    Allergies:  Allergies[4]    Medications:  Scheduled Medications[5]  PRN Medications[6]    Review of Systems:  A comprehensive 14 point review of systems was completed.  Pertinent positives and negatives noted in the the HPI.    Physical Exam:  Vital signs: Blood pressure 159/78, pulse 66, temperature 97.8 °F (36.6 °C), temperature source Oral, resp. rate 20, height 1.88 m (6' 2\"), weight 107.5 kg (237 lb), SpO2 93%.  General: No acute distress. Alert and oriented x 3. NGT+  Neck: No lymphadenopathy.  No JVD.   Respiratory: Clear to auscultation bilaterally.  No wheezes. No rhonchi.  Cardiovascular: S1, S2.  Regular rate and rhythm.  No murmurs.   Abdomen: Soft, nontender, nondistended.  Positive bowel sounds. No rebound  tenderness  Neurologic: No focal neurological deficits.      Laboratory Data:    Lab Results   Component Value Date    WBC 6.5 06/19/2025    HGB 13.5 06/19/2025    HCT 40.9 06/19/2025    .0 06/19/2025    CREATSERUM 1.27 06/19/2025    BUN 19 06/19/2025     06/19/2025    K 4.4 06/19/2025     06/19/2025    CO2 30.0 06/19/2025    GLU 98 06/19/2025    CA 9.2 06/19/2025    ALB 4.3 06/19/2025    ALKPHO 87 06/19/2025    BILT 0.6 06/19/2025    TP 7.4 06/19/2025    AST 59 06/19/2025    ALT 27 06/19/2025    INR 1.09 06/18/2025    PTP 13.9 06/18/2025    LIP 20 06/18/2025    PSA 1.08 06/19/2025    MG 2.1 06/19/2025    PHOS 2.9 06/19/2025       Imaging:    Imaging data reviewed in Epic.    Assessment/Plan:    # Abdominal mass: Soft tissue mass in the retroperitoneum measuring about 5.4 cm which encases the aorta and renal veins.  Unclear if this is lymphadenopathy versus fibrosis.  LDH elevated.  Also 1.6 cm liver lesion.  Recommend CT chest.  Tumor markers pending.  Recommend biopsy.  He is agreeable.    Reviewed report of CT A/P done 7/20 in [place] which did not show any problems.    Will follow peripherally until results back.  Once biopsy done, he may be discharged from oncology perspective once SBO has resolved.  Will contact him when results are available.  He will get the rest of his care in [place].    Daylin Fritz M.D.    [facility]    [facility]  [address] [place], [address]    6/19/2025         [1]   Past Medical History:   Hickey's syndrome   [2]   Past Surgical History:  Procedure Laterality Date    Colonoscopy      egd and colonoscopy in 12/24 per pt in [place] per pt    Excis lumbar disk,one level      Removal gallbladder      Spine surgery procedure unlisted     [3]   Family History  Problem Relation Age of Onset    Hypertension Father     Cancer Mother    [4]   Allergies  Allergen Reactions    Prednisone UNKNOWN and OTHER (SEE  COMMENTS)     PT STATES SIDE EFFECT TO ORAL ONLY (tolerates IV)  headaches   [5]    pantoprazole  40 mg Intravenous BID    enoxaparin  40 mg Subcutaneous Daily   [6]   acetaminophen    melatonin    morphINE **OR** morphINE **OR** morphINE    metoclopramide

## 2025-06-19 NOTE — PLAN OF CARE
Assumed care at 0700  Pt AxOx4, RA  Denies pain  NG w/ minimal output. Clamped, ok for clears per surg onc  Bx completed this afternoon. Site C/D/I  BM x2. C diff pending next stool.  Pt and family updated on POC

## 2025-06-19 NOTE — CONSULTS
[facility]      Patient Name:  Moises Grace   YOB: 1950   Gender:  Male   Appt Date:  6/19/2025   Provider:  OZZY Ackerman     CHIEF COMPLAINT  Small Bowel Obstruction  Intra-Abdominal Soft tissue mass     PROBLEMS  Reviewed Problem List[1]     History of Present Illness:  Patient is a 75 year old man who we are currently being consulted for surgical oncology recommendations regarding a newly diagnosed possible malignant small bowel obstruction.    Patient is originally from [place] but is in town visiting family for the next week. He noted about 2 days ago driving in that he was having worsening nausea and 8 episodes of vomiting. This then progressed to abdominal pain so he decided to report to the ER. On arrival, VSS, no leukocytosis, electrolyte abnormalities or elevated Cr. Lipase WNL. LA 1.6. CT A/P showed a conglomerate soft tissue within the retroperitoneum encasing the aorta and bilateral renal vessels, 3.0 x 1.8 x 2.5 cm soft tissue mass within the central mesentery, additional subcentimeter lymph node, and dilated proximal small bowel measuring up to 3.5 cm. CEA < 0.5, CA 19-9 6.9, . NGT was placed in the ER with improvement in symptoms. Last bowel movement or flatus was 2 days ago. Surgical history includes cholecystectomy. Denies prior history of cancer or malignancy.      Vital Signs:  /78 (BP Location: Left arm)   Pulse 66   Temp 97.8 °F (36.6 °C) (Oral)   Resp 20   Ht 1.88 m (6' 2\")   Wt 107.5 kg (237 lb)   SpO2 93%   BMI 30.43 kg/m²      Medications Reviewed:  Medications - Current[2]     Allergies Reviewed:  Allergies[3]     History:  Reviewed:  Past Medical History[4]   Reviewed:  Past Surgical History[5]   Reviewed Social History:  Short Social Hx on File[6]   Reviewed:  Family History[7]   Review of Systems:  GENERAL HEALTH: feels well, + fatigue.   RESPIRATORY: denies shortness of breath, wheezing or cough   CARDIOVASCULAR:  denies chest pain, SOB, edema,orthopnea, no palpitations   GI: + nausea, vomiting. Denies constipation, diarrhea; no rectal bleeding  GENITAL/: no blood in urine  MUSCULOSKELETAL: no joint complaints, no back pain  NEURO: no tingling, numbness, weakness  ENDOCRINE: denies weight loss/gain. No B symptoms   PSYCH: no mood changes       Physical Examination:  Attempted to visit patient 2x, down in CT     Document Review:  Lab Results   Component Value Date    WBC 6.5 06/19/2025    HGB 13.5 06/19/2025    HCT 40.9 06/19/2025    .0 06/19/2025    CREATSERUM 1.27 06/19/2025    BUN 19 06/19/2025     06/19/2025    K 4.4 06/19/2025     06/19/2025    CO2 30.0 06/19/2025    GLU 98 06/19/2025    CA 9.2 06/19/2025    ALB 4.3 06/19/2025    ALKPHO 87 06/19/2025    BILT 0.6 06/19/2025    TP 7.4 06/19/2025    AST 59 06/19/2025    ALT 27 06/19/2025    INR 1.09 06/18/2025    LIP 20 06/18/2025    PSA 1.08 06/19/2025    MG 2.1 06/19/2025    PHOS 2.9 06/19/2025    TROPHS 4 06/18/2025     CONCLUSION:    1. Findings concerning for small-bowel obstruction with transition point in the right anterior abdomen.  Upstream small bowel dilation measures up to 3.5 cm in diameter with collapsed small bowel distally to the transition site.   2. Haziness of the central mesentery with several subcentimeter lymph nodes are present, which can be seen in the setting of sclerosing mesenteritis, however there is an enlarged lymph node/soft tissue mass measuring up to 3.0 cm, which could reflect   underlying lymphoma versus other malignancy, correlate clinically.  Consider PET-CT for further evaluation.   3. Bulky conglomerate soft tissue within the retroperitoneum, which could reflect conglomerate lymphadenopathy.  Findings can also be seen in the setting of retroperitoneal fibrosis, however given mesenteric findings lymphoma/malignancy should be   excluded.  Consider PET-CT for further evaluation.      Procedure(s):  None     Assessment  / Plan:  Suspected Malignant SBO  Attempted to visit patient 2x  today. Both occurrences patient was down in CT.   - No acute surgical issues  - Continue conservative management of SBO with NGT decompression. Per RN, had 2 bowel movements this morning and N/V have improved. OK to clamp NGT.  - OK for sips of clears  - Check residual in 4 hours, if < 200 then OK to remove and ADAT  - As for other CT findings, pattern concerning for possible malignancy. Agree with CT guided biopsy of RP lymph node  - If SBO improves, can discharge from surg onc standpoint. Per chart review, patient would like to see further care in [place] where he is from  - Continue IV fluids  - DVT Prophy with lovenox  - GI Prophy: PPI       Discussed with Dr Philip  Electronically Signed by: OZZY Ackerman             [1]   Patient Active Problem List  Diagnosis    Allergic rhinitis    Hickey's esophagus    Small bowel obstruction (HCC)    Intra-abdominal lymphadenopathy    Gastroesophageal reflux disease   [2] No current outpatient medications on file.  [3]   Allergies  Allergen Reactions    Prednisone UNKNOWN and OTHER (SEE COMMENTS)     PT STATES SIDE EFFECT TO ORAL ONLY (tolerates IV)  headaches   [4]   Past Medical History:   Hickey's syndrome   [5]   Past Surgical History:  Procedure Laterality Date    Colonoscopy      egd and colonoscopy in 12/24 per pt in [place] per pt    Excis lumbar disk,one level      Removal gallbladder      Spine surgery procedure unlisted     [6]   Social History  Socioeconomic History    Marital status:    Tobacco Use    Smoking status: Never    Smokeless tobacco: Never   Vaping Use    Vaping status: Never Used   Substance and Sexual Activity    Alcohol use: Yes     Alcohol/week: 1.0 standard drink of alcohol     Types: 1 Glasses of wine per week     Comment: occ wine socially    Drug use: Never     Social Drivers of Health     Food Insecurity: No Food Insecurity (6/18/2025)    NCSS - Food Insecurity      Worried About Running Out of Food in the Last Year: No     Ran Out of Food in the Last Year: No   Transportation Needs: No Transportation Needs (6/18/2025)    NCSS - Transportation     Lack of Transportation: No   Housing Stability: Not At Risk (6/18/2025)    NCSS - Housing/Utilities     Has Housing: Yes     Worried About Losing Housing: No     Unable to Get Utilities: No   [7]   Family History  Problem Relation Age of Onset    Hypertension Father     Cancer Mother

## 2025-06-19 NOTE — PROCEDURES
[facility]   part of [facility]  Procedure Note    Moises Grace Patient Status:  Inpatient    6/3/1950 MRN [id number]   Location [facility] 7NE-A Attending Enrique Kiser MD   Hosp Day # 1 PCP No primary care provider on file.     Procedure: CT guided retroperitoneal biopsy    Pre-Procedure Diagnosis:  retroperitoneal mass    Post-Procedure Diagnosis: same    Anesthesia:  Local    Findings:  solid cores    Specimens: 6 18 g cores    Blood Loss:  <5 ml    Tourniquet Time: n/a  Complications:  None  Drains:  none    Secondary Diagnosis:  none    Jah Lu MD  2025

## 2025-06-19 NOTE — PROGRESS NOTES
[facility]   part of [facility]     Hospitalist Progress Note     Moises Grace Patient Status:  Inpatient    6/3/1950 MRN [id number]   Location [facility] 7NE-A Attending Enrique Kiser MD   Hosp Day # 1 PCP No primary care provider on file.     Chief Complaint: n/v    Subjective:     Patient much improved. Had 2 loose BM's today    Objective:    Review of Systems:   A comprehensive review of systems was completed; pertinent positive and negatives stated in subjective.    Vital signs:  Temp:  [97.8 °F (36.6 °C)-98.5 °F (36.9 °C)] 98.5 °F (36.9 °C)  Pulse:  [64-92] 70  Resp:  [16-25] 17  BP: (146-165)/() 148/82  SpO2:  [93 %-100 %] 98 %    Physical Exam:    General: No acute distress  Respiratory: No wheezes, no rhonchi  Cardiovascular: S1, S2, regular rate and rhythm  Abdomen: Soft, Non-tender, non-distended, positive bowel sounds  Neuro: No new focal deficits.   Extremities: No edema      Diagnostic Data:    Labs:  Recent Labs   Lab 25  1142 25  0616   WBC 9.5 6.5   HGB 15.2 13.5   MCV 87.9 89.7   .0 269.0   INR 1.09  --        Recent Labs   Lab 25  1142 25  0616   * 98   BUN 21 19   CREATSERUM 1.30 1.27   CA 9.2 9.2   ALB 4.5 4.3    145   K 3.5 4.4    107   CO2 26.0 30.0   ALKPHO 105 87   AST 34* 59*   ALT 23 27   BILT 0.7 0.6   TP 8.1 7.4       Estimated Glomerular Filtration Rate: 59 mL/min/1.73m2 (A) (result from lab).    Recent Labs   Lab 25  1142 25  1349   TROPHS 4 4       Recent Labs   Lab 25  114   PTP 13.9   INR 1.09                  Microbiology    No results found for this visit on 25.      Imaging: Reviewed in Epic.    Medications:    pantoprazole  40 mg Intravenous BID    enoxaparin  40 mg Subcutaneous Daily       Assessment & Plan:      #SBO  -ngt - clamp today  -IVF  -CLD  -gen surg    #RP mass  #liver mass  -onc  -CT biopsy of RP mass today  -pt to f/u onc for results in 1 week    #GERD  #Hickey's  sierra Kiser MD    Supplementary Documentation:     Quality:  DVT Mechanical Prophylaxis:   SCDs,    DVT Pharmacologic Prophylaxis   Medication    enoxaparin (Lovenox) 40 MG/0.4ML SUBQ injection 40 mg                Code Status: Not on file  Valenzuela: No urinary catheter in place  Valenzuela Duration (in days):   Central line:    BRAXTON: 1-2 d    Discharge is dependent on: return of bowel fxn  At this point Mr. Grace is expected to be discharge to: home    The 21st Century Cures Act makes medical notes like these available to patients in the interest of transparency. Please be advised this is a medical document. Medical documents are intended to carry relevant information, facts as evident, and the clinical opinion of the practitioner. The medical note is intended as peer to peer communication and may appear blunt or direct. It is written in medical language and may contain abbreviations or verbiage that are unfamiliar.

## 2025-06-19 NOTE — IMAGING NOTE
Tentative plan for retroperitoneal lymph node/soft tissue mass biopsy later this afternoon. RN spoke with DANA Cm and requested that the patient remain NPO. Patient has been NPO since midnight and has an NG tube. Per Soila, patient is consentable and has IV access. RN instructed DANA Cm to hold the patient's morning Lovenox. Soila verbalized an understanding. Per DANA Cm, patient denies taking blood thinners and NSAIDs at home.

## 2025-06-20 VITALS
TEMPERATURE: 99 F | HEART RATE: 74 BPM | WEIGHT: 237 LBS | SYSTOLIC BLOOD PRESSURE: 137 MMHG | RESPIRATION RATE: 25 BRPM | HEIGHT: 74 IN | DIASTOLIC BLOOD PRESSURE: 71 MMHG | OXYGEN SATURATION: 98 % | BODY MASS INDEX: 30.42 KG/M2

## 2025-06-20 LAB — HCG BETA SUBUNIT TUMOR MARKER QN: <1 MIU/ML

## 2025-06-20 PROCEDURE — 99239 HOSP IP/OBS DSCHRG MGMT >30: CPT | Performed by: HOSPITALIST

## 2025-06-20 NOTE — PLAN OF CARE
Assumed care 1900, 6/19, NOC. Denies pain, slept overnight, no residual via NGT -> removed per SurgOnc, denies nausea, ADAT, pt updated on plan of care, needs met

## 2025-06-20 NOTE — PLAN OF CARE
NURSING DISCHARGE NOTE    Discharged Home via Wheelchair.  Accompanied by Support staff  Belongings Taken by patient/family.    Cleared for discharge by all consults. Discharge AVS completed and reviewed with patient and family. Discussed discharge medications, including purpose, dose, and side effects. Reviewed follow-ups and the importance of maintaining those appointments. Discussed discharge instructions/precautions and when to notify MD vs seek emergency medical care. All questions and concerns addressed appropriately. Pt and family demonstrate adequate understanding of all instructions.      Problem: RESPIRATORY - ADULT  Goal: Achieves optimal ventilation and oxygenation  Description: INTERVENTIONS:  - Assess for changes in respiratory status  - Assess for changes in mentation and behavior  - Position to facilitate oxygenation and minimize respiratory effort  - Oxygen supplementation based on oxygen saturation or ABGs  - Provide Smoking Cessation handout, if applicable  - Encourage broncho-pulmonary hygiene including cough, deep breathe, Incentive Spirometry  - Assess the need for suctioning and perform as needed  - Assess and instruct to report SOB or any respiratory difficulty  - Respiratory Therapy support as indicated  - Manage/alleviate anxiety  - Monitor for signs/symptoms of CO2 retention  Outcome: Adequate for Discharge     Problem: GASTROINTESTINAL - ADULT  Goal: Minimal or absence of nausea and vomiting  Description: INTERVENTIONS:  - Maintain adequate hydration with IV or PO as ordered and tolerated  - Nasogastric tube to low intermittent suction as ordered  - Evaluate effectiveness of ordered antiemetic medications  - Provide nonpharmacologic comfort measures as appropriate  - Advance diet as tolerated, if ordered  - Obtain nutritional consult as needed  - Evaluate fluid balance  Outcome: Adequate for Discharge  Goal: Maintains adequate nutritional intake (undernourished)  Description:  INTERVENTIONS:  - Monitor percentage of each meal consumed  - Identify factors contributing to decreased intake, treat as appropriate  - Assist with meals as needed  - Monitor I&O, WT and lab values  - Obtain nutritional consult as needed  - Optimize oral hygiene and moisture  - Encourage food from home; allow for food preferences  - Enhance eating environment  Outcome: Adequate for Discharge

## 2025-06-20 NOTE — PROGRESS NOTES
NGT removed last night. Patient passing gas. No further bowel movements since yesterday. Denies nausea or vomiting. No abdominal pain.     Blood pressure 143/82, pulse 59, temperature 97.6 °F (36.4 °C), temperature source Temporal, resp. rate 21, height 1.88 m (6' 2\"), weight 107.5 kg (237 lb), SpO2 95%.    Intake/Output Summary (Last 24 hours) at 6/20/2025 0844  Last data filed at 6/19/2025 1601  Gross per 24 hour   Intake 125 ml   Output --   Net 125 ml          Constitutional:  NAD.   Eyes: non-icteric.    Abdomen: Soft, non-tender, non-distended.   Musculoskeletal: no edema.      - No acute surgical issues  - ADAT to soft  - Decrease IVF  - DVT Prophy: Lovenox  - GI Prophy: PPI  - If tolerates diet advancement OK to discharge from surg onc standpoint. Will follow up pathology once finalized. Patient would like to pursue definitive treatment closer to home in [place].     Patient seen and examined with OZZY Escalona    Attending:  I have reviewed the above listed note and evaluation by the physician assistant     I have personally seen and examined the patient and reviewed all relevant labs and reports. I agree with her physical exam and the data listed in the report.     I agree with the above listed assessment and plan which I formulated.  Patient in no apparent distress.  Clinically he is much improved.  Abdomen is soft and nontender.  Advance diet as tolerated.  Anticipate discharge.    Care discussed as above    [place] MD

## 2025-06-21 NOTE — DISCHARGE SUMMARY
[place] HOSPITALIST  DISCHARGE SUMMARY     Moises Grace Patient Status:  Inpatient    6/3/1950 MRN [id number]   Location [facility] 7NE-A Attending No att. providers found   Hosp Day # 2 PCP No primary care provider on file.     Date of Admission: 2025  Date of Discharge:  2025     Discharge Disposition: Home or Self Care    Discharge Diagnosis:  #SBO  -ngt - removed  -tolerated diet  -gen surg    #RP mass  #liver mass  -onc  -CT biopsy of RP mass   -pt to f/u onc for results in 1 week     #GERD  #Hickey's esophagus    History of Present Illness: Moises Grace is a 75 year old male admitted with intractable nausea vomiting and abdominal pain, started yesterday around 1 PM according to patient.  Patient states he has been vomiting every 2 hours since then according to patient.  Patient states last episode of vomiting was around 3 AM today .  Has history of Hickey's esophagus and acid reflux and takes Protonix at home.  Had 1 episode of diarrhea.  Lower abdomen feels sore according to patient.  Patient states he started feeling short of breath and went to his primary care physician today and was sent to the emergency room.  Denies any chest pain.  Patient states he has had prior EGD and colonoscopy and polypectomy  in 2024 while in [place].  CT abdomen pelvis done in ER showed SBO and NG tube inserted from ER and admitted with surgeon on consult.    Brief Synopsis: pt w/ SBO. Ngt placed and had return of bowel fxn after 2 days. Had incidental RP and liver mass noted on CT. Underwent CT biopsy of RP mass. Pt to f/u w/ onc next week for results before he returns to [place].    Lace+ Score: 37  59-90 High Risk  29-58 Medium Risk  0-28   Low Risk  Patient was referred to the [facility].    TCM Follow-Up Recommendation:  LACE 29-58: Moderate Risk of readmission after discharge from the hospital.      Procedures during hospitalization:   RP mass biopsy    Incidental or  significant findings and recommendations (brief descriptions):  none    Lab/Test results pending at Discharge:   pathology    Consultants:  Onc, surger    Discharge Medication List:     Discharge Medications        CONTINUE taking these medications        Instructions Prescription details   pantoprazole 40 MG Tbec  Commonly known as: Protonix      Take 1 tablet (40 mg total) by mouth daily.   Refills: 0              [place]    Follow-up appointment:   Chari Fritz MD  [address] [place] [address]  [facility]  [facility]  [facility] [address]  [phone]    Follow up in 1 week(s)      [facility]  [address] [place], [place] [address]  [facility]  [facility] [address] [phone]  Schedule an appointment as soon as possible for a visit      [place] MD  [address] [place] [address]  [address]  [place] [address]  [phone]    Follow up  As needed    Appointments for Next 30 Days 6/20/2025 - 7/20/2025        Date Arrival Time Visit Type Length Department Provider     6/24/2025  2:00 PM  NON-[facility] HOSPITAL FOLLOW UP [5060] 60 min [facility] Rohini Green APRN    Patient Instructions:         Location Instructions:     Masks are optional for all patients and visitors, unless otherwise indicated. Note: A $50 fee will be charged for missed appointments or same-day cancellations. Please provide 24 hours' notice if you need to cancel or reschedule your appointment.               6/27/2025  1:45 PM  POST-HOSPITAL FOLLOW-UP [2640] 30 min [facility] Hematology Oncology [place] Chari Fritz MD    Patient Instructions:         Location Instructions:     **IF YOU NEED LABWORK OR AN INFUSION ALONG WITH YOUR APPOINTMENT, YOU MUST CALL TO SCHEDULE.**  Your appointment is on the [facility] campus in the Cancer Center. The address is [address] [place] [address]. Please register at the Cancer Center  on the second floor.  Masks are optional for all patients  and visitors, unless otherwise indicated.                      Vital signs:  Temp:  [97.6 °F (36.4 °C)-98.8 °F (37.1 °C)] 98.8 °F (37.1 °C)  Pulse:  [59-82] 74  Resp:  [21-25] 25  BP: (126-143)/(71-96) 137/71  SpO2:  [95 %-98 %] 98 %    Physical Exam:    General: No acute distress   Lungs: clear to auscultation  Cardiovascular: S1, S2  Abdomen: Soft      -----------------------------------------------------------------------------------------------  PATIENT DISCHARGE INSTRUCTIONS: See electronic chart    Enrique Kiser MD    Total time spent on discharge plannin minutes     The  [employer] Cures Act makes medical notes like these available to patients in the interest of transparency. Please be advised this is a medical document. Medical documents are intended to carry relevant information, facts as evident, and the clinical opinion of the practitioner. The medical note is intended as peer to peer communication and may appear blunt or direct. It is written in medical language and may contain abbreviations or verbiage that are unfamiliar.

## 2025-06-23 ENCOUNTER — PATIENT OUTREACH (OUTPATIENT)
Dept: CASE MANAGEMENT | Age: 75
End: 2025-06-23

## 2025-06-23 NOTE — PROGRESS NOTES
Transitional Care Management   Discharge Date: 25  Contact Date: 2025    Assessment:  TCM Initial Assessment    General:  Assessment completed with: Patient  Patient Subjective: Pt doing well since discharge.  Chief Complaint: Intractable vomiting  Verify patient name and  with patient/ caregiver: Yes    Hospital Stay/Discharge:  Prior to leaving the hospital were your Discharge Instructions reviewed with you?: Yes  Did you receive a copy of your written Discharge Instructions?: Yes  Do you feel better or worse since you left the hospital or emergency department?: Better    Follow - Up Appointment:  Do you have a follow-up appointment?: Yes  Date: 25  Physician: INÉS Yip  Are there any barriers to getting to your follow-up appointment?: No    Home Health/DME:  Prior to leaving the hospital was Home Health (HH) arranged for you?: No     Prior to leaving the hospital or emergency department was Durable Medical Equipment (DME), medical supplies, or infusions arranged for you?: No  Are DME/medical supply/infusions needs identified by staff during this assessment?: No     Medications/Diet:       Were you given a different diet per your Discharge Instructions?: No     Questions/Concerns:  Do you have any questions or concerns that have not been discussed?: No       Follow-up Appointments:  Your appointments       Date & Time Appointment Department (Center)    2025 2:00 PM CDT Hospital Follow Up with Rohini Green APRN [facility] ([facility])        2025 1:45 PM CDT Follow-Up Visit with Chari Fritz MD [facility] Hematology Oncology [place] ([facility])              [facility] Hematology Oncology [facility]  [address]  [facility] [address]  [phone] Transitional Care Clinic  [facility]  [address]  [place]  [place] [address]  [phone]            [facility]  Was TCC Ordered: Yes  Was TCC Scheduled: Yes   - If yes: [x]  Advised patient to bring all medications and blood glucose meter/supplies if applicable.      Specialist  Does the patient have any other follow-up appointment(s) that need to be scheduled? Yes   -If yes: Care Manager reviewed upcoming specialist appointments with patient: Yes   -Does the patient need assistance scheduling appointment(s): No      Book By Date: 7/4/25

## 2025-06-24 ENCOUNTER — VIRTUAL PHONE E/M (OUTPATIENT)
Age: 75
End: 2025-06-24
Attending: INTERNAL MEDICINE
Payer: MEDICARE

## 2025-06-24 ENCOUNTER — OFFICE VISIT (OUTPATIENT)
Dept: INTERNAL MEDICINE CLINIC | Facility: CLINIC | Age: 75
End: 2025-06-24
Payer: MEDICARE

## 2025-06-24 VITALS
SYSTOLIC BLOOD PRESSURE: 160 MMHG | WEIGHT: 227 LBS | BODY MASS INDEX: 29.13 KG/M2 | HEIGHT: 74 IN | RESPIRATION RATE: 15 BRPM | HEART RATE: 98 BPM | TEMPERATURE: 98 F | OXYGEN SATURATION: 98 % | DIASTOLIC BLOOD PRESSURE: 74 MMHG

## 2025-06-24 DIAGNOSIS — K56.609 SMALL BOWEL OBSTRUCTION (HCC): Primary | ICD-10-CM

## 2025-06-24 DIAGNOSIS — R59.0 INTRA-ABDOMINAL LYMPHADENOPATHY: ICD-10-CM

## 2025-06-24 DIAGNOSIS — C83.33 DIFFUSE LARGE B-CELL LYMPHOMA OF INTRA-ABDOMINAL LYMPH NODES (HCC): Primary | ICD-10-CM

## 2025-06-24 DIAGNOSIS — R16.0 LIVER MASS: ICD-10-CM

## 2025-06-24 DIAGNOSIS — R59.0 RETROPERITONEAL LYMPHADENOPATHY: ICD-10-CM

## 2025-06-24 PROCEDURE — 99495 TRANSJ CARE MGMT MOD F2F 14D: CPT | Performed by: NURSE PRACTITIONER

## 2025-06-24 RX ORDER — OMEGA-3/DHA/EPA/FISH OIL 300-1000MG
1 CAPSULE ORAL EVERY MORNING
COMMUNITY

## 2025-06-24 RX ORDER — UBIDECARENONE 100 MG
100 CAPSULE ORAL DAILY
COMMUNITY

## 2025-06-24 RX ORDER — MULTIVITAMIN
1 TABLET ORAL EVERY MORNING
COMMUNITY

## 2025-06-24 NOTE — PROGRESS NOTES
Virtual Telephone Check-In    Moises Grace verbally consents to a Virtual/Telephone Check-In visit on 06/24/25.  Patient has been referred to the [facility] website at [email or web address] to review the yearly Consent to Treat document.    Patient understands and accepts financial responsibility for any deductible, co-insurance and/or co-pays associated with this service.    Duration of the service: 30 minutes      [facility] [place] [facility] Progress Note      Patient Name:  Moises Grace  YOB: 1950  Medical Record Number:  [id number]    Date of visit:  6/24/2025    CHIEF COMPLAINT: Newly diagnosed DLBCL.    HPI:     75 year old that I saw as a consult at [facility] after he was admitted last week with sudden onset of abdominal pain, vomiting, diarrhea.  CT showed SBO with enlarged LN/soft tissue mass in the mesentery and bulky soft tissue mass/lymph nodes in the retroperitoneum.  No fevers, chills, night sweats.  Intentional 10 pound weight loss.  Colonoscopy a year back in [place].  He resides in [place], drove here to meet his son.  Planning to go back this weekend.  He is feeling well at this time.    SOCIAL HISTORY:    , lives in [facility] [place].  Retired.  His son lives locally here.    ROS:     Constitutional: no fever, chills fatigue,night sweats or weight loss  Neurologic: no headache, seizures, diplopia or weakness  Respiratory: no cough, SOB or hemoptysis  Cardiovascular: no chest pain, ankle swelling, TAVERA  GI: no nausea, vomiting, diarrhea or BRBPR  Musculoskeletal: no body-ache or joint pain  Dermatologic: no alopecia, rash, pruritis  : no hematuria, dysuria or frequency  Psych: no confusion or depression   Heme: no easy bruising or bleeding     ALLERGIES:  Allergies[1]    MEDICATIONS:  Current Medications[2]    VITALS:  Height: 188 cm (6' 2\") (06/24 1341)  Weight: 103 kg (227 lb) (06/24 1341)  BSA (Calculated - sq m): 2.29 sq meters (06/24 1341)  Pulse: 98  (06/24 1341)  BP: 160/74 (06/24 1341)  Temp: 98.3 °F (36.8 °C) (06/24 1341)  Do Not Use - Resp Rate: --  SpO2: 98 % (06/24 1341)    PS:  ECOG:     PHYSICAL EXAM:      LABS:     No results found for this or any previous visit (from the past 24 hours).    ASSESSMENT AND PLAN:     # Newly diagnosed DLBCL: 75 year old admitted with abdominal pain, SBO.  CT A/P-enlarged LN/soft tissue mass measuring 3 cm in the mesentery and bulky conglomerate soft tissue mass in the retroperitoneum.  No abnormal findings on CT chest.   units/L.    Underwent CT biopsy of retroperitoneal mass 6/19/25.  Path-DLBCL.  Cells are positive for CD10, CD20, Bcl-2, BCL6.  Cells are negative for cyclin D1 and c-Myc.  Ki-67 60%.  Findings are consistent with DLBCL, germinal center origin by Joseph criteria.  Does not meet criteria for double expresser lymphoma.  FISH studies for high-grade B-cell lymphoma are ordered and results are pending.    Clinical stage II, R-IPI:3.    He is going back to [place] this weekend and then plans to follow-up with his PCP and local oncologist.    Discussed recommendations with patient.    - PET scan.  - Consider bone marrow biopsy.  -Echocardiogram.  -Port placement.  -Chemotherapy with R-CHOP.    Patient was given ample opportunity to ask questions and is comfortable receiving care back in his hometown in [place].      Daylin Fritz M.D.    [facility]    [facility] [place]  [address] [place], [address]    6/24/2025             [1]   Allergies  Allergen Reactions    Prednisone OTHER (SEE COMMENTS)     PT STATES SIDE EFFECT TO ORAL ONLY (tolerates IV)  Severe headaches   [2]   Current Outpatient Medications   Medication Sig Dispense Refill    Cholecalciferol 25 MCG (1000 UT) Oral Tab Take 1 tablet (1,000 Units total) by mouth every morning.      Coenzyme Q10 100 MG Oral Cap Take 100 mg by mouth daily.      Multiple Vitamin Oral Tab Take 1 tablet by mouth  every morning.      Probiotic Product (DAILY PROBIOTIC) Oral Cap Take 1 capsule by mouth every morning.      pantoprazole 40 MG Oral Tab EC Take 1 tablet (40 mg total) by mouth daily.

## 2025-06-24 NOTE — PROGRESS NOTES
TR[facility]AL [facility]ARE CLINIC PHARMACIST MEDICATION RECONCILIATION        Moises Grcae MRN [id number]    6/3/1950 PCP No primary care provider on file.       Comments: Medication history completed by the [facility] Pharmacist with the patient, spouse and son in the office.    Outpatient Encounter Medications as of 2025   Medication Sig    Cholecalciferol 25 MCG (1000 UT) Oral Tab Take 1 tablet (1,000 Units total) by mouth every morning.    Coenzyme Q10 100 MG Oral Cap Take 100 mg by mouth daily.    Multiple Vitamin Oral Tab Take 1 tablet by mouth every morning.    Probiotic Product (DAILY PROBIOTIC) Oral Cap Take 1 capsule by mouth every morning.    pantoprazole 40 MG Oral Tab EC Take 1 tablet (40 mg total) by mouth daily.     Medication Adherence Assessment:   Patient reports taking the Pantoprazole at least 30 minutes before breakfast in the morning.  States he has been taking the medication for years.  Updated medication list with current Vitamins and supplements.      Reviewed all medications in detail with patient including dose, indication, timing of administration, monitoring parameters, and potential side effects of medications.   Patient confirmed understanding.     Thank you,    Yasemin Aragon, PharmD, 2025, 1:50 PM  [facility] Clinic

## 2025-06-25 ENCOUNTER — TELEPHONE (OUTPATIENT)
Age: 75
End: 2025-06-25

## 2025-06-25 NOTE — PROGRESS NOTES
TCM VISIT  [facility]      HISTORY   HPI: Moises Grace is a 75 year old male here today for hospital follow up for small bowel obstruction, retroperitoneal lymphadenopathy, intra-abdominal lymphadenopathy, liver mass.  Moises Grace was discharged from [facility]  to Home or Self Care.  Admit Date: 6/18/25. Discharge Date: 6/20/25.     Hospital Discharge Diagnosis:    #SBO  -ngt - removed  -tolerated diet  -gen surg    #RP mass  #liver mass  -onc  -CT biopsy of RP mass   -pt to f/u onc for results in 1 week     #GERD  #Hickey's esophagus      Hospital stay was uncomplicated.  Moises Grace was discharge with soft diet, no new meds and a referral to the [facility] for continued follow up.      Today, the patient is being seen for hospital follow-up.  He reports doing much better since discharge.  He is accompanied by his spouse and son at time of exam.    He presented to ED with intractable nausea, vomiting, abdominal pain that started the day prior around 1 PM.  He states he has been vomiting every 2 hours since that time.  His last episode of vomiting was around 3 AM the day of admission.  He has history of Hickey's esophagus and acid reflux and takes Protonix at home.  Had 1 episode of diarrhea with lower abdominal area feeling sore.  Reports he started feeling short of breath and went to his primary care physician and was sent to ED.  He reports his last EGD and colonoscopy were December 2020 for in [place] with polypectomy.  CTAP done in ED showed SBO and had NG tube inserted and was admitted with surgery on consult.  He was admitted with small bowel obstruction with NG tube in place to [facility] and he had return of bowel function after 2 days.  Had incidental RP and liver mass noted on CT.  Underwent CT-guided biopsy of RP mass.  He is to follow-up with oncology in 6/27 for biopsy results prior to returning to [place].  He was cleared for discharge and discharged home in stable  condition.    Interactive contact within 2 business days post discharge first initiated on Date: 6/23/2025      Allergies:  Allergies[1]   Current Meds:  Current Outpatient Medications   Medication Sig Dispense Refill    Cholecalciferol 25 MCG (1000 UT) Oral Tab Take 1 tablet (1,000 Units total) by mouth every morning.      Coenzyme Q10 100 MG Oral Cap Take 100 mg by mouth daily.      Multiple Vitamin Oral Tab Take 1 tablet by mouth every morning.      Probiotic Product (DAILY PROBIOTIC) Oral Cap Take 1 capsule by mouth every morning.      pantoprazole 40 MG Oral Tab EC Take 1 tablet (40 mg total) by mouth daily.       No current facility-administered medications for this visit.       HISTORY: reconciled and reviewed with patient  Past Medical History[2]   Past Surgical History[3]   Family History[4]   Short Social Hx on File[5]     Imaging & Consults:  CT BIOPSY ABDOMEN RETROPERITONEAL MASS (CPT=77012/85183)  Result Date: 6/19/2025  CONCLUSION:  Uneventful CT-guided retroperitoneal mass biopsy, as described above.  The patient was instructed to obtain follow up care and biopsy results from the referring physician.    LOCATION:  [place]   Dictated by (CST): Jah Lu MD on 6/19/2025 at 5:41 PM     Finalized by (CST): Jah Lu MD on 6/19/2025 at 5:44 PM       CT CHEST(CONTRAST ONLY) (CPT=71260)  Result Date: 6/19/2025  CONCLUSION:  No sign of intrathoracic malignancy.  Limited imaging of the upper abdomen shows what appears to be improving bowel obstruction pattern, with decrease in the degree of gaseous distention of upper loops of small bowel.  Partial visualization of other upper abdominal abnormalities which are diagnostically described on the CT abdomen examination from yesterday, please see that report for details.   LOCATION:  [place]   Dictated by (CST): Sanford Franz MD on 6/19/2025 at 9:23 AM     Finalized by (CST): Sanford Franz MD on 6/19/2025 at 9:28 AM       XR CHEST AP PORTABLE   (CPT=71045)  Result Date: 6/18/2025  CONCLUSION:  1. Tip of enteric tube in the expected location of the stomach.   LOCATION:  [facility]      Dictated by (CST): Venita Martin MD on 6/18/2025 at 3:41 PM     Finalized by (CST): Venita Martin MD on 6/18/2025 at 3:42 PM       CT ABDOMEN+PELVIS(CONTRAST ONLY)(CPT=74177)  Result Date: 6/18/2025  CONCLUSION:  1. Findings concerning for small-bowel obstruction with transition point in the right anterior abdomen.  Upstream small bowel dilation measures up to 3.5 cm in diameter with collapsed small bowel distally to the transition site. 2. Haziness of the central mesentery with several subcentimeter lymph nodes are present, which can be seen in the setting of sclerosing mesenteritis, however there is an enlarged lymph node/soft tissue mass measuring up to 3.0 cm, which could reflect underlying lymphoma versus other malignancy, correlate clinically.  Consider PET-CT for further evaluation. 3. Bulky conglomerate soft tissue within the retroperitoneum, which could reflect conglomerate lymphadenopathy.  Findings can also be seen in the setting of retroperitoneal fibrosis, however given mesenteric findings lymphoma/malignancy should be excluded.  Consider PET-CT for further evaluation. 4. Please see above for further findings.   LOCATION:  [id number]   Dictated by (CST): Nathen Meek MD on 6/18/2025 at 2:08 PM     Finalized by (CST): Nathen Meek MD on 6/18/2025 at 2:22 PM       XR CHEST AP PORTABLE  (CPT=71045)  Result Date: 6/18/2025  CONCLUSION:  No evidence of active cardiopulmonary disease.   LOCATION:  [place]      Dictated by (CST): Jah Lu MD on 6/18/2025 at 12:58 PM     Finalized by (CST): Jah Lu MD on 6/18/2025 at 12:59 PM         Lab Results   Component Value Date/Time    WBC 6.5 06/19/2025 06:16 AM    HGB 13.5 06/19/2025 06:16 AM    HCT 40.9 06/19/2025 06:16 AM    .0 06/19/2025 06:16 AM    GLU 98 06/19/2025 06:16 AM     06/19/2025 06:16 AM    K 4.4  06/19/2025 06:16 AM     06/19/2025 06:16 AM    CO2 30.0 06/19/2025 06:16 AM    BUN 19 06/19/2025 06:16 AM    CREATSERUM 1.27 06/19/2025 06:16 AM    CA 9.2 06/19/2025 06:16 AM    MG 2.1 06/19/2025 06:16 AM    PHOS 2.9 06/19/2025 06:16 AM    ALB 4.3 06/19/2025 06:16 AM    ALT 27 06/19/2025 06:16 AM    AST 59 (H) 06/19/2025 06:16 AM    INR 1.09 06/18/2025 11:42 AM         There is no immunization history on file for this patient.    ROS:  GENERAL: energy stable, denies fever, weakness  SKIN: denies any skin changes, + right upper back biopsy site healed  EYES: denies blurred vision, eye pain  HEENT: denies ear pain, loss of hearing, sore throat  RESPIRATORY: denies cough, denies dyspnea with exertion  CARDIOVASCULAR: denies syncope, chest pain, fatigue, palpitations   GI: + Mild cramping while eating, denies abdominal pain, melena, constipation, diarrhea, nausea, vomiting   MUSCULOSKELETAL: denies pain, states normal range of motion of extremities  NEUROLOGIC: denies confusion, balance difficulty  PSYCHIATRIC: denies depression or anxiety  HEMATOLOGIC: denies history of anemia, bruising, bleeding    PHYSICAL EXAM:  Vitals:    06/24/25 1341   BP: 160/74   Pulse: 98   Resp: 15   Temp: 98.3 °F (36.8 °C)       GENERAL: well developed, well nourished, in no apparent distress, good historian  INTEGUMENTARY: warm, dry, no rashes, + right upper back biopsy site healed  HEENT: atraumatic, normocephalic, sclera white, conjunctivae pink  NECK: supple  CHEST: no chest tenderness  LUNGS: clear to auscultation bilaterally, no wheezes, rhonchi or rales, normal respiratory effort  CARDIO: S1, S2, RRR without audible murmur  GI: + BS to all quadrants, no tenderness  MUSCULOSKELETAL: + ROM in all joints, no evidence of swelling, pain   EXTREMITIES: no cyanosis, or edema  NEURO: Oriented x3  PSYCHIATRIC: appropriate affect    ASSESSMENT/ PLAN:   1. Small bowel obstruction/retroperitoneal lymphadenopathy/intra-abdominal  lymphadenopathy/liver mass  CTAP showed small bowel obstruction and he had NG tube placed in ER and general surgery was consulted  NGT to [facility] for bowel decompression with return of bowel function after 2 days and required no surgical intervention  Was found incidentally on CT-RP and liver mass  Underwent CT-guided biopsy of RP mass  Upper right back biopsy site healed  Denies any nausea/vomiting or fever/chills  Denies any diarrhea/abdominal pain-does report some cramping with eating that lasts only minutes  Denies any shortness of breath  Was not prescribed any new medications  He is tolerating a normal diet  Appetite is good/drinking well  Moving bowels/passing gas  Tolerating a soft diet  Returning to [place] on Saturday  Follow-up with oncology Dr. Fritz on 6/27 at 1:45 PM  Has appointments arranged in [place] already pending leaving on Saturday to return home  All pertinent hospital records, labs, radiology from current hospitalization reviewed        Orders Placed This Encounter    Cholecalciferol 25 MCG (1000 UT) Oral Tab     Sig: Take 1 tablet (1,000 Units total) by mouth every morning.    Coenzyme Q10 100 MG Oral Cap     Sig: Take 100 mg by mouth daily.    Multiple Vitamin Oral Tab     Sig: Take 1 tablet by mouth every morning.    Probiotic Product (DAILY PROBIOTIC) Oral Cap     Sig: Take 1 capsule by mouth every morning.           Medications & Refills for this Visit:  Current Medications[6]  Requested Prescriptions      No prescriptions requested or ordered in this encounter         Health Maintenance:  Health Maintenance   Topic Date Due    Annual Physical  Never done    Colorectal Cancer Screening  Never done    Zoster Vaccines (1 of 2) Never done    Pneumococcal Vaccine: 50+ Years (2 of 2 - PCV) 06/24/2011    COVID-19 Vaccine (1 - 2024-25 season) Never done    Influenza Vaccine (Season Ended) 10/01/2025    PSA  06/19/2027    Annual Depression Screening  Completed    Fall Risk Screening (Annual)   Completed    Meningococcal B Vaccine  Aged Out       Chronic Care Management Referral: N/A      Transitional Care Management Certification:  During the visit, I accessed [employer] and/or Care Everywhere and personally reviewed the following for the recent hospitalization: provider notes, consults, summaries, labs and other test results and the pertinent findings are documented below.     Medication Reconciliation:  I am aware of an inpatient discharge within the last 30 days.  The discharge medication list has been reconciled with the patient's current medication list and reviewed by me.  See medication list for additions of new medication, and changes to current doses of medications and discontinued medications.        Discharge Disposition/Plan:     No future appointments.     1.  Transitional Care Clinic Visit: Next visit Discharged  2.  PCP Visit: None returning to [place]  3.  Chronic Care Management: N/A     INÉS Yip, 6/24/2025  [facility]  [phone]         [1]   Allergies  Allergen Reactions    Prednisone OTHER (SEE COMMENTS)     PT STATES SIDE EFFECT TO ORAL ONLY (tolerates IV)  Severe headaches   [2]   Past Medical History:   Hickey's syndrome   [3]   Past Surgical History:  Procedure Laterality Date    Colonoscopy      egd and colonoscopy in 12/24 per pt in [place] per pt    Excis lumbar disk,one level      Removal gallbladder      Spine surgery procedure unlisted     [4]   Family History  Problem Relation Age of Onset    Hypertension Father     Cancer Mother    [5]   Social History  Socioeconomic History    Marital status:    Tobacco Use    Smoking status: Never    Smokeless tobacco: Never   Vaping Use    Vaping status: Never Used   Substance and Sexual Activity    Alcohol use: Yes     Alcohol/week: 1.0 standard drink of alcohol     Types: 1 Glasses of wine per week     Comment: occ wine socially    Drug use: Never     Social Drivers of Health     Food Insecurity:  No Food Insecurity (6/18/2025)    NCSS - Food Insecurity     Worried About Running Out of Food in the Last Year: No     Ran Out of Food in the Last Year: No   Transportation Needs: No Transportation Needs (6/18/2025)    NCSS - Transportation     Lack of Transportation: No   Housing Stability: Not At Risk (6/18/2025)    NCSS - Housing/Utilities     Has Housing: Yes     Worried About Losing Housing: No     Unable to Get Utilities: No   [6]    Cholecalciferol 25 MCG (1000 UT) Oral Tab Take 1 tablet (1,000 Units total) by mouth every morning.      Coenzyme Q10 100 MG Oral Cap Take 100 mg by mouth daily.      Multiple Vitamin Oral Tab Take 1 tablet by mouth every morning.      Probiotic Product (DAILY PROBIOTIC) Oral Cap Take 1 capsule by mouth every morning.      pantoprazole 40 MG Oral Tab EC Take 1 tablet (40 mg total) by mouth daily.

## 2025-06-25 NOTE — TELEPHONE ENCOUNTER
LVM asking him if he wanted to keep or cancel the appt on Friday.   Recently spoke with MD, and planning to go back to [place] where he lives.   Either is fine with us, if he wants to let us know his preference.

## 2025-06-27 ENCOUNTER — APPOINTMENT (OUTPATIENT)
Age: 75
End: 2025-06-27
Attending: INTERNAL MEDICINE
Payer: MEDICARE

## 2025-06-27 PROBLEM — R16.0 LIVER MASS: Status: ACTIVE | Noted: 2025-06-27

## 2025-07-02 ENCOUNTER — POST-OP (OUTPATIENT)
Age: 75
End: 2025-07-02

## 2025-07-02 DIAGNOSIS — H26.492: ICD-10-CM

## 2025-07-02 DIAGNOSIS — H53.2: ICD-10-CM

## 2025-07-02 DIAGNOSIS — H52.4: ICD-10-CM

## 2025-07-02 DIAGNOSIS — H43.813: ICD-10-CM

## 2025-07-02 DIAGNOSIS — H18.591: ICD-10-CM

## 2025-07-02 DIAGNOSIS — H04.123: ICD-10-CM

## 2025-07-02 DIAGNOSIS — H49.12: ICD-10-CM

## 2025-07-02 PROCEDURE — 99024 POSTOP FOLLOW-UP VISIT: CPT

## 2025-07-08 LAB
CD10 CELLS NFR SPEC: 94 %
CD10/CD19: 91 %
CD19 CELLS NFR SPEC: 94 %
CD19+/CD200+: <1 %
CD2 CELLS NFR SPEC: 6 %
CD20 CELLS NFR SPEC: 90 %
CD200 CELLS: 1 %
CD3 CELLS NFR SPEC: 6 %
CD3+/TCRGD+: 1 %
CD3+CD4+ CELLS NFR SPEC: 4 %
CD3+CD4+ CELLS/CD3+CD8+ CLL SPEC: 2
CD3+CD8+ CELLS NFR SPEC: 2 %
CD3-/CD56+: 1 %
CD34 CELLS NFR SPEC: <1 %
CD38 CELLS NFR SPEC: 1 %
CD38+/CD19+: 1 %
CD45 CELLS NFR SPEC: 100 %
CD5 CELLS NFR SPEC: 3 %
CD5/CD19 CELLS: 1 %
CD7 CELLS NFR SPEC: 6 %
CELL SURF KAPPA/LAMBDA RATIO: 22.5
CELL SURF LAMBDA LIGHT CHAIN: 4 %
CELL SURFACE KAPPA LIGHT CHAIN: 90 %
TCR G-D CELLS NFR SPEC: 1 %

## (undated) NOTE — LETTER
Patient Name: Moises Grace        : 6/3/1950       Medical Record #: [id number]    CONSENT FOR PROCEDURES/SEDATION    Date: 2025       Time: 1:49 PM        1. I authorize the performance upon Moises Grace the following:    __________________________________________________________________________    2. I authorize Dr. DELPHINE Lu (and whomever is designated as the doctor’s assistant), to perform the above mentioned procedures.    3. If any unforeseen conditions arise during this procedure calling for additional procedures, operations, or medications (including anesthesia and blood transfusion), I  further request and authorize the doctor to do whatever he/she deems advisable in my interest.    4. I consent to the taking and reproduction of any photographs in the course of this procedure for professional purposes.    5. I consent to the administration of such sedation as may be considered necessary or advisable by the physician responsible for this service, with the exception of  _____________________________.    6. I have been informed by my doctor of the nature and purpose of this procedure/sedation, possible alternative methods of treatment, risk involved and possible complications.      Signature of Patient:  ___________________________    Signature of person authorized to consent for patient: Relationship to patient:  ___________________________    ___________________    Witness: ____________________     Date: ______________    Provider: ____________________     Date: ______________